# Patient Record
Sex: MALE | Employment: OTHER | URBAN - METROPOLITAN AREA
[De-identification: names, ages, dates, MRNs, and addresses within clinical notes are randomized per-mention and may not be internally consistent; named-entity substitution may affect disease eponyms.]

---

## 2021-11-11 ENCOUNTER — OFFICE VISIT (OUTPATIENT)
Dept: ENDOCRINOLOGY | Facility: HOSPITAL | Age: 61
End: 2021-11-11
Payer: COMMERCIAL

## 2021-11-11 VITALS
BODY MASS INDEX: 40.43 KG/M2 | HEART RATE: 80 BPM | HEIGHT: 74 IN | WEIGHT: 315 LBS | DIASTOLIC BLOOD PRESSURE: 82 MMHG | SYSTOLIC BLOOD PRESSURE: 148 MMHG

## 2021-11-11 DIAGNOSIS — Z79.4 TYPE 2 DIABETES MELLITUS WITH HYPERGLYCEMIA, WITH LONG-TERM CURRENT USE OF INSULIN (HCC): Primary | ICD-10-CM

## 2021-11-11 DIAGNOSIS — I10 PRIMARY HYPERTENSION: ICD-10-CM

## 2021-11-11 DIAGNOSIS — E11.42 DIABETIC POLYNEUROPATHY ASSOCIATED WITH TYPE 2 DIABETES MELLITUS (HCC): ICD-10-CM

## 2021-11-11 DIAGNOSIS — E11.65 TYPE 2 DIABETES MELLITUS WITH HYPERGLYCEMIA, WITH LONG-TERM CURRENT USE OF INSULIN (HCC): Primary | ICD-10-CM

## 2021-11-11 DIAGNOSIS — E11.21 DIABETIC NEPHROPATHY ASSOCIATED WITH TYPE 2 DIABETES MELLITUS (HCC): ICD-10-CM

## 2021-11-11 DIAGNOSIS — E78.2 MIXED HYPERLIPIDEMIA: ICD-10-CM

## 2021-11-11 PROBLEM — E11.319 DIABETIC RETINOPATHY ASSOCIATED WITH TYPE 2 DIABETES MELLITUS (HCC): Status: ACTIVE | Noted: 2021-11-11

## 2021-11-11 PROCEDURE — 99245 OFF/OP CONSLTJ NEW/EST HI 55: CPT | Performed by: INTERNAL MEDICINE

## 2021-11-11 RX ORDER — ASPIRIN 81 MG/1
81 TABLET ORAL DAILY
COMMUNITY

## 2021-11-11 RX ORDER — INSULIN ASPART 100 [IU]/ML
INJECTION, SOLUTION INTRAVENOUS; SUBCUTANEOUS
COMMUNITY
Start: 2021-10-24

## 2021-11-11 RX ORDER — INSULIN DEGLUDEC 200 U/ML
76 INJECTION, SOLUTION SUBCUTANEOUS
COMMUNITY
Start: 2021-11-10

## 2021-11-11 RX ORDER — AMLODIPINE BESYLATE 5 MG/1
5 TABLET ORAL DAILY
COMMUNITY
Start: 2021-10-14

## 2021-11-11 RX ORDER — LOSARTAN POTASSIUM AND HYDROCHLOROTHIAZIDE 25; 100 MG/1; MG/1
1 TABLET ORAL DAILY
COMMUNITY
Start: 2021-10-09

## 2021-11-11 RX ORDER — ATORVASTATIN CALCIUM 20 MG/1
20 TABLET, FILM COATED ORAL DAILY
COMMUNITY
Start: 2021-11-01

## 2023-04-22 LAB — HBA1C MFR BLD HPLC: 7.7 %

## 2023-05-04 ENCOUNTER — OFFICE VISIT (OUTPATIENT)
Dept: ENDOCRINOLOGY | Facility: HOSPITAL | Age: 63
End: 2023-05-04

## 2023-05-04 VITALS
OXYGEN SATURATION: 94 % | WEIGHT: 315 LBS | HEIGHT: 74 IN | DIASTOLIC BLOOD PRESSURE: 78 MMHG | BODY MASS INDEX: 40.43 KG/M2 | SYSTOLIC BLOOD PRESSURE: 164 MMHG | HEART RATE: 66 BPM

## 2023-05-04 DIAGNOSIS — E11.65 TYPE 2 DIABETES MELLITUS WITH HYPERGLYCEMIA, WITH LONG-TERM CURRENT USE OF INSULIN (HCC): Primary | ICD-10-CM

## 2023-05-04 DIAGNOSIS — Z79.4 TYPE 2 DIABETES MELLITUS WITH HYPERGLYCEMIA, WITH LONG-TERM CURRENT USE OF INSULIN (HCC): Primary | ICD-10-CM

## 2023-05-04 RX ORDER — FUROSEMIDE 20 MG/1
TABLET ORAL
COMMUNITY
Start: 2023-05-02

## 2023-05-04 RX ORDER — PEN NEEDLE, DIABETIC 31 GX5/16"
NEEDLE, DISPOSABLE MISCELLANEOUS 4 TIMES DAILY
COMMUNITY
Start: 2023-04-10

## 2023-05-04 RX ORDER — BLOOD SUGAR DIAGNOSTIC
STRIP MISCELLANEOUS 3 TIMES DAILY
COMMUNITY
Start: 2023-04-07

## 2023-05-04 NOTE — PATIENT INSTRUCTIONS
Monitor diet and maintain physical activity  Continue with insulin dosages  Send in blood sugar logs in 2 weeks  Contact the office with any concerns or questions  We will work on getting Dexcom approved  You can follow up with diabetes education to help with set up if needed  Follow-up in 3 months with lab work completed prior to visit

## 2023-05-04 NOTE — PROGRESS NOTES
Mari Edwards 59 y o  male MRN: [id number]    Encounter: [id number]      Assessment/Plan     Assessment: This is a 59y o -year-old male with 2 diabetes with neuropathy, hypertension and hyperlipidemia  Plan:  1  Type 2 diabetes: Recent hemoglobin A1c was 7 7  Unfortunately he did not present with blood sugar logs at today's office visit  Will not make any adjustments to his insulin  Did go into detail about different options in managing his glucose levels  He is interested in both a Dexcom and an insulin pump  Although he does not do carb counting, it does seem like he has an understanding of it  This time will not make any adjustments to his insulin and he will continue with Tresiba 70 units daily and sliding scale of NovoLog  Continue with lifestyle modifications to help improve glucose levels  Contact the office with any concerns or questions  Follow-up in 3 months with lab work completed prior to visit  Patient is a type II diabetic currently utilizing for more insulin injections a day  He is checking glucose levels 3 or more times a day and make adjustments to his insulin based on current glucose levels  Because of these factors, I think it be beneficial for him to utilize a Dexcom and minimize the risk of future complications  2   Diabetic neuropathy: Stable  Diabetic foot exam was completed today  Did encourage him following up with podiatry  3   Diabetic nephropathy: Both BUN and creatinine were elevated  Did discuss possible cause of this one of them could possibly be dehydration  Ultimately would like to get his glucose levels under control  Continue with losartan-HCTZ  Repeat microalbumin prior to next office visit  4   Hypertension: Pressure was elevated in office today  Currently asymptomatic  Has been working with his PCP to help reduce blood pressure  Continue with current medication    We will repeat CMP prior to next office visit and assess kidney function  5   Hyperlipidemia: Lipid panel is excellent with triglycerides being slightly high high  This could be due to hyperglycemia  We will continue to monitor  Continue with atorvastatin at this time  CC: Type 2 diabetes follow-up    History of Present Illness     HPI:  Kylie Aviles is a 62y o  year old male with type 2 diabetes, insulin requiring with retinopathy, neuropathy, nephropathy for 14 years , hypertension, hyperlipidemia for follow-up  Has been lost to follow-up and last seen November 2021  He was switched to insulin therapy in March 2019 when 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 with 1 and half months in the hospital   Prior to that, he was only on metformin and was poorly managing his diabetes  He is on insulin at home and takes [place] Insulin 70 units at bedtime and novolog insulin via sliding scale, 12 units for blood sugars , 14 units for blood sugars 101-150, 16 units for blood sugars 151-200, 18 units for blood sugars 201-250, and 20 units for blood sugars over 250  He admits to polyuria, polydipsia, and nocturia every 2 and half to 3 hours  He denies polyphagia and blurry vision  His feet do feel cold and he has some tingling at times along with numbness  He denies chest pain or shortness of breath  He denies heart attack, stroke and claudication but does admit to neuropathy, nephropathy and retinopathy          While in hospital when diagnosed with Padmini gangrene and switched insulin therapy, he was instructed to eat 5 carbohydrate servings per meals which he tries to do an does understand that 1 of those is equal to 15 g of carbohydrate  Unfortunately, he is self-employed and works 10 hour days  This causes him to eat supper very late and he has a variable eating schedule  This has unchanged since his last office visit  He is interested in utilizing a CGM and insulin pump to help better control his gross levels  Does have a basic understanding of making adjustments to insulin based on carbohydrates  Is following up with a dietitian and has made dietary changes      Hypoglycemic episodes:  None recently  H/o of hypoglycemia causing hospitalization or intervention such as glucagon injection  or ambulance call  No   Hypoglycemia symptoms: dizziness, sweating and feels lightheaded and off now     Treatment of hypoglycemia: will eat a piece of candy or drink a juice  Glucagon:No   Medic alert tag: recommended,Yes       The patient's last eye exam was in November 2021 at [facility] 85 eye every 6 weks for retinopathy, gets injections as needed, post lasar surgery twice in the past   The patient's last foot exam was not recently  Does not see podiatry  Last A1C was 6 7%  In October 2021      Blood Sugar/Glucometer/Pump/CGM review:   He checks his blood sugars at least 3 or more times a day  Unfortunately he did not bring in blood sugar logs in today  States that fasting blood sugars are typically between 100-150      He has hyperlipidemia and takes atorvastatin 20 mg daily  He denies chest pain or shortness of breath      He has hypertension and takes losartan/ HCTZ 100 mg/25 mg daily and amlodipine 5 mg daily  He denies headache or stroke-like symptoms  Review of Systems   Constitutional: Negative for activity change, appetite change, fatigue and unexpected weight change  HENT: Negative for trouble swallowing  Eyes: Negative for visual disturbance  Respiratory: Negative for chest tightness and shortness of breath  Cardiovascular: Positive for leg swelling  Negative for chest pain and palpitations  Gastrointestinal: Negative for abdominal pain, diarrhea, nausea and vomiting  Endocrine: Negative for cold intolerance, heat intolerance, polydipsia, polyphagia and polyuria  Genitourinary: Negative for frequency  Nocturia   Skin: Negative for rash and wound  Neurological: Positive for numbness   Negative for dizziness, weakness, light-headedness and headaches  Psychiatric/Behavioral: Negative for dysphoric mood and sleep disturbance  The patient is not nervous/anxious          Historical Information   Past Medical History:   Diagnosis Date    Padmini's gangrene of scrotum     necrotizing fasciitis    Necrotizing fasciitis ([address] )     scrotum     Past Surgical History:   Procedure Laterality Date    APPENDECTOMY      KNEE ARTHROSCOPY      LAPAROSCOPIC CHOLECYSTECTOMY      SCROTAL SURGERY      multiple surgeries and debribement for scrotal absess and infection     Social History   Social History     Substance and Sexual Activity   Alcohol Use Never     Social History     Substance and Sexual Activity   Drug Use Never     Social History     Tobacco Use   Smoking Status Former    Types: Cigarettes    Quit date: 200    Years since quittin 3   Smokeless Tobacco Never     Family History:   Family History   Adopted: Yes   Problem Relation Age of Onset    Obesity Mother     Obesity Sister     Obesity Brother     No Known Problems Son     Obesity Sister        Meds/Allergies   Current Outpatient Medications   Medication Sig Dispense Refill    amLODIPine (NORVASC) 5 mg tablet Take 5 mg by mouth daily        aspirin (ECOTRIN LOW STRENGTH) 81 mg EC tablet Take 81 mg by mouth daily      atorvastatin (LIPITOR) 20 mg tablet Take 20 mg by mouth daily        B-D ULTRAFINE III SHORT PEN 31G X 8 MM MISC 4 (four) times a day      furosemide (LASIX) 20 mg tablet       losartan-hydrochlorothiazide (HYZAAR) 100-25 MG per tablet Take 1 tablet by mouth daily        Multiple Vitamins-Minerals (MENS 50+ MULTI VITAMIN/MIN PO) Take by mouth      Multiple Vitamins-Minerals (OCUVITE ADULT 50+ PO) Take by mouth Take 2 daily      NovoLOG FlexPen 100 units/mL injection pen 12-22 units per sliding scale at meals 3 times daily      OneTouch Ultra test strip 3 (three) times a day Test     Katarzyna Perches Tresiba FlexTouch 200 units/mL "CONCENTRATED U-200 injection pen Inject 70 Units under the skin daily at bedtime       No current facility-administered medications for this visit  Allergies   Allergen Reactions    Penicillins Other (See Comments)     unknown       Objective   Vitals: Blood pressure 164/78, pulse 66, height 6' 2\" (1 88 m), weight (!) 147 kg (324 lb), SpO2 94 %  Physical Exam  Vitals and nursing note reviewed  Constitutional:       General: He is not in acute distress  Appearance: Normal appearance  He is not diaphoretic  HENT:      Head: Normocephalic and atraumatic  Eyes:      General: No scleral icterus  Extraocular Movements: Extraocular movements intact  Conjunctiva/sclera: Conjunctivae normal       Pupils: Pupils are equal, round, and reactive to light  Cardiovascular:      Rate and Rhythm: Normal rate and regular rhythm  Pulses: no weak pulses          Dorsalis pedis pulses are 2+ on the right side and 2+ on the left side  Posterior tibial pulses are 2+ on the right side and 2+ on the left side  Heart sounds: No murmur heard  Pulmonary:      Effort: Pulmonary effort is normal  No respiratory distress  Breath sounds: Normal breath sounds  No wheezing  Musculoskeletal:      Cervical back: Normal range of motion  Right lower leg: Edema present  Left lower leg: Edema present  Feet:      Right foot:      Skin integrity: Callus and dry skin present  No ulcer, skin breakdown, erythema or warmth  Left foot:      Skin integrity: Callus and dry skin present  No ulcer, skin breakdown, erythema or warmth  Lymphadenopathy:      Cervical: No cervical adenopathy  Skin:     General: Skin is warm and dry  Neurological:      Mental Status: He is alert and oriented to person, place, and time  Mental status is at baseline  Sensory: No sensory deficit        Gait: Gait normal    Psychiatric:         Mood and Affect: Mood normal          Behavior: Behavior normal     " "     Thought Content: Thought content normal      Patient's shoes and socks removed  Right Foot/Ankle   Right Foot Inspection  Skin Exam: skin normal, skin intact, dry skin, callus and callus  No warmth, no erythema, no maceration, no abnormal color, no pre-ulcer and no ulcer  Toe Exam: ROM and strength within normal limits and swelling  No tenderness, erythema and  no right toe deformity    Sensory   Vibration: diminished  Proprioception: intact  Monofilament testing: diminished    Vascular  Capillary refills: < 3 seconds  The right DP pulse is 2+  The right PT pulse is 2+  Left Foot/Ankle  Left Foot Inspection  Skin Exam: skin normal, skin intact, dry skin and callus  No warmth, no erythema, no maceration, normal color, no pre-ulcer and no ulcer  Toe Exam: ROM and strength within normal limits and swelling  No tenderness, no erythema and no left toe deformity  Sensory   Vibration: diminished  Proprioception: intact  Monofilament testing: intact    Vascular  Capillary refills: < 3 seconds  The left DP pulse is 2+  The left PT pulse is 2+  Assign Risk Category  No deformity present  No loss of protective sensation  No weak pulses  Risk: 0        The history was obtained from the review of the chart, patient  Portions of the record may have been created with voice recognition software  Occasional wrong word or \"sound a like\" substitutions may have occurred due to the inherent limitations of voice recognition software  Read the chart carefully and recognize, using context, where substitutions have occurred    "

## 2023-05-05 ENCOUNTER — TELEPHONE (OUTPATIENT)
Dept: ADMINISTRATIVE | Facility: OTHER | Age: 63
End: 2023-05-05

## 2023-05-05 NOTE — LETTER
Diabetic Eye Exam Form    Date Requested: 23  Patient: Vasile Nunez  Patient : 1960   Referring Provider: JANINE Santiago      DIABETIC Eye Exam Date _______________________________      Type of Exam MUST be documented for Diabetic Eye Exams  Please CHECK ONE  Retinal Exam       Dilated Retinal Exam       OCT       Optomap-Iris Exam      Fundus Photography       Left Eye - Please check Retinopathy or No Retinopathy        Exam did show retinopathy    Exam did not show retinopathy       Right Eye - Please check Retinopathy or No Retinopathy       Exam did show retinopathy    Exam did not show retinopathy       Comments __________________________________________________________    Practice Providing Exam ______________________________________________    Exam Performed By (print name) _______________________________________      Provider Signature ___________________________________________________      These reports are needed for  compliance  Please fax this completed form and a copy of the Diabetic Eye Exam report to our office located at [place] [address] as soon as possible via Fax [phone] kelsy Hugo: Phone [phone]  We thank you for your assistance in treating our mutual patient

## 2023-05-05 NOTE — LETTER
Diabetic Eye Exam Form    Date Requested: 05/10/23  Patient: Tre Lockhart  Patient : 1960   Referring Provider: JANINE Bhandari      DIABETIC Eye Exam Date _______________________________      Type of Exam MUST be documented for Diabetic Eye Exams  Please CHECK ONE  Retinal Exam       Dilated Retinal Exam       OCT       Optomap-Iris Exam      Fundus Photography       Left Eye - Please check Retinopathy or No Retinopathy        Exam did show retinopathy    Exam did not show retinopathy       Right Eye - Please check Retinopathy or No Retinopathy       Exam did show retinopathy    Exam did not show retinopathy       Comments __________________________________________________________    Practice Providing Exam ______________________________________________    Exam Performed By (print name) _______________________________________      Provider Signature ___________________________________________________      These reports are needed for  compliance  Please fax this completed form and a copy of the Diabetic Eye Exam report to our office located at [place] [address] as soon as possible via Fax [phone] attention Anthony Calderón: Phone [phone]  We thank you for your assistance in treating our mutual patient

## 2023-05-05 NOTE — TELEPHONE ENCOUNTER
----- Message from Agata Cortez MA sent at 5/4/2023  1:52 PM EDT -----  Regarding: diabetic eye exam  05/04/23 1:57 PM    Hello, our patient Hussein Paredes has had diabetic eye exam completed/performed  Please assist in updating the patient chart by HILARY Dennison  The date of service is 2023      Thank you,  Agata Cortez MA  [facility]

## 2023-05-05 NOTE — TELEPHONE ENCOUNTER
Upon review of the In Basket request and the patient's chart, initial outreach has been made via fax to facility  Please see Contacts section for details       Thank you  Bairon Santoro MA

## 2023-05-10 NOTE — TELEPHONE ENCOUNTER
Upon review of the In Basket request and the patient's chart, initial outreach has been made via fax to facility  Please see Contacts section for details       Thank you  Jocelyn Meek MA

## 2023-05-17 NOTE — TELEPHONE ENCOUNTER
As a final attempt, a third outreach has been made via fax to facility  Please see Contacts section for details  This encounter will be closed and completed by end of day  Should we receive the requested information because of previous outreach attempts, the requested patient's chart will be updated appropriately       Thank you  Dominick Knox MA

## 2023-08-17 LAB
LEFT EYE DIABETIC RETINOPATHY: POSITIVE
RIGHT EYE DIABETIC RETINOPATHY: POSITIVE
SEVERITY (EYE EXAM): NORMAL

## 2023-09-06 ENCOUNTER — OFFICE VISIT (OUTPATIENT)
Dept: ENDOCRINOLOGY | Facility: HOSPITAL | Age: 63
End: 2023-09-06
Payer: COMMERCIAL

## 2023-09-06 VITALS
SYSTOLIC BLOOD PRESSURE: 142 MMHG | OXYGEN SATURATION: 96 % | DIASTOLIC BLOOD PRESSURE: 78 MMHG | HEART RATE: 66 BPM | WEIGHT: 315 LBS | BODY MASS INDEX: 42.24 KG/M2

## 2023-09-06 DIAGNOSIS — Z79.4 TYPE 2 DIABETES MELLITUS WITH HYPERGLYCEMIA, WITH LONG-TERM CURRENT USE OF INSULIN (HCC): Primary | ICD-10-CM

## 2023-09-06 DIAGNOSIS — E11.65 TYPE 2 DIABETES MELLITUS WITH HYPERGLYCEMIA, WITH LONG-TERM CURRENT USE OF INSULIN (HCC): Primary | ICD-10-CM

## 2023-09-06 DIAGNOSIS — E78.2 MIXED HYPERLIPIDEMIA: ICD-10-CM

## 2023-09-06 DIAGNOSIS — I10 PRIMARY HYPERTENSION: ICD-10-CM

## 2023-09-06 DIAGNOSIS — E66.9 OBESITY WITH SERIOUS COMORBIDITY, UNSPECIFIED CLASSIFICATION, UNSPECIFIED OBESITY TYPE: ICD-10-CM

## 2023-09-06 PROCEDURE — 99214 OFFICE O/P EST MOD 30 MIN: CPT | Performed by: PHYSICIAN ASSISTANT

## 2023-09-06 RX ORDER — AMLODIPINE BESYLATE 10 MG/1
10 TABLET ORAL DAILY
COMMUNITY
Start: 2023-07-06

## 2023-09-06 RX ORDER — ATENOLOL 25 MG/1
25 TABLET ORAL DAILY
COMMUNITY
Start: 2023-08-04

## 2023-09-06 NOTE — PROGRESS NOTES
Michael Kramer 59 y.o. male MRN: [id number]    Encounter: [id number]      Assessment/Plan     Assessment: This is a 59y.o.-year-old male with type 2 diabetes with neuropathy, hypertension and hyperlipidemia. Plan:  1. Type 2 diabetes: Recent hemoglobin A1c was 7.7. However no lab work was completed prior to today's office visit. States that he will get it done tomorrow. Glucose logs show that fasting glucose levels are running high, but is doing better throughout the day. At this time I would like him to continue with Tresiba 70 units daily and NovoLog 12 units with breakfast and lunch plus the addition of a sliding scale. As for dinner I like to increase his NovoLog to 16 units plus a sliding scale. Continue with lifestyle modifications to help improve glucose levels. Referred him to our diabetes educator to help with diabetic diet, and also carb counting. Contact the office with any concerns or questions. Follow-up in 3 months with lab work completed prior to visit.     Patient is a type II diabetic currently utilizing for more insulin injections a day. He is checking glucose levels 3 or more times a day and make adjustments to his insulin based on current glucose levels. Because of these factors, I think it be beneficial for him to utilize a Dexcom and minimize the risk of future complications.     2.  Diabetic neuropathy: Stable. Diabetic foot exam is up-to-date.     3. Diabetic nephropathy: Both BUN and creatinine were elevated. No lab work completed prior to today's office visit, but once results are in we will make further recommendations. Ultimately would like to get his glucose levels under control. Continue with losartan-HCTZ. Repeat microalbumin prior to next office visit.     4. Hypertension: Normotensive in the office today. Has been working with his PCP to help reduce blood pressure. Continue with current medication.   We will repeat CMP prior to next office visit and assess kidney function.     5. Hyperlipidemia: Lipid panel is excellent with triglycerides being slightly high high. This could be due to hyperglycemia. We will continue to monitor. Continue with atorvastatin at this time. CC: Type 2 diabetes follow-up    History of Present Illness     HPI:  Lizandro Bump old male with type 2 diabetes, insulin requiring with retinopathy, neuropathy, nephropathy for 14 years , hypertension, hyperlipidemia for follow-up.  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 Jamie Nidia to that, he was only on metformin and was poorly managing his diabetes. He is on insulin at home and takes [place] Insulin 70 units at bedtime and novolog insulin via sliding scale, 12 units for blood sugars , 14 units for blood sugars 101-150, 16 units for blood sugars 151-200, 18 units for blood sugars 201-250, and 20 units for blood sugars over 250. He admits to polyuria, polydipsia, and nocturia every 2 and half to 3 hours.  He denies polyphagia and blurry vision.  States recently neuropathy has improved.  He denies chest pain or shortness of breath. He denies heart attack, stroke and claudication but does admit to neuropathy, nephropathy and retinopathy.  States that he has interest in bariatric surgery. Also thinks that he needs to have adjustments to his insulin as he is having a difficult time getting glucose levels under control.       While in hospital when diagnosed with Padmini gangrene and switched insulin therapy, he was instructed to eat 5 carbohydrate servings per meals which he tries to do an does understand that 1 of those is equal to 15 g of carbohydrate.  Unfortunately, he is self-employed and works 10 hour days.  This causes him to eat supper very late and he has a variable eating schedule. This has unchanged since his last office visit. He is interested in utilizing a CGM and insulin pump to help better control his gross levels. Does have a basic understanding of making adjustments to insulin based on carbohydrates. Is interested in following up with our diabetes educator.     Hypoglycemic episodes: Last night had symptoms of hypoglycemia. Otherwise rare episodes. H/o of hypoglycemia causing hospitalization or intervention such as glucagon injection  or ambulance call  No.  Hypoglycemia symptoms: dizziness, sweating and feels lightheaded and off now. .  Treatment of hypoglycemia: will eat a piece of candy or drink a juice. Glucagon:No.  Medic alert tag: recommended,Yes.      The patient's last eye exam was in November 2021 at [facility] eye every 6 weks for retinopathy, gets injections as needed, post lasar surgery twice in the past. Tylor Nelson patient's last foot exam was not recently. Does not see podiatry.  Most recent hemoglobin A1c was completed April 22, 2023 and was 7.7.     Blood Sugar/Glucometer/Pump/CGM review: Currently checking glucose levels 3 times a day. Fasting glucose levels are ranging between 140 and 180 with a few outliers. Prior to lunch is typically in the low 100s. Prior to dinner he is in the low 100s to mid 100s.    He has hyperlipidemia and takes atorvastatin 20 mg daily. He denies chest pain or shortness of breath.     He has hypertension and takes losartan/ HCTZ 100 mg/25 mg daily and amlodipine 5 mg daily.  He denies headache or stroke-like symptoms. Review of Systems   Constitutional: Negative for activity change, appetite change, fatigue and unexpected weight change. HENT: Negative for trouble swallowing. Eyes: Negative for visual disturbance. Respiratory: Negative for chest tightness and shortness of breath. Cardiovascular: Positive for leg swelling. Negative for chest pain and palpitations. Gastrointestinal: Negative for abdominal pain, diarrhea, nausea and vomiting.    Endocrine: Negative for cold intolerance, heat intolerance, polydipsia, polyphagia and polyuria. Genitourinary: Negative for frequency. Nocturia   Skin: Negative for rash and wound. Neurological: Negative for dizziness, weakness, light-headedness, numbness and headaches. Psychiatric/Behavioral: Negative for dysphoric mood and sleep disturbance. The patient is not nervous/anxious.         Historical Information   Past Medical History:   Diagnosis Date   • Padmini's gangrene of scrotum     necrotizing fasciitis   • Necrotizing fasciitis ([address])     scrotum     Past Surgical History:   Procedure Laterality Date   • APPENDECTOMY     • KNEE ARTHROSCOPY     • LAPAROSCOPIC CHOLECYSTECTOMY     • SCROTAL SURGERY      multiple surgeries and debribement for scrotal absess and infection     Social History   Social History     Substance and Sexual Activity   Alcohol Use Never     Social History     Substance and Sexual Activity   Drug Use Never     Social History     Tobacco Use   Smoking Status Former   • Types: Cigarettes   • Quit date:    • Years since quittin.7   Smokeless Tobacco Never     Family History:   Family History   Adopted: Yes   Problem Relation Age of Onset   • Obesity Mother    • Obesity Sister    • Obesity Brother    • No Known Problems Son    • Obesity Sister        Meds/Allergies   Current Outpatient Medications   Medication Sig Dispense Refill   • amLODIPine (NORVASC) 10 mg tablet Take 10 mg by mouth daily     • aspirin (ECOTRIN LOW STRENGTH) 81 mg EC tablet Take 81 mg by mouth daily     • atenolol (TENORMIN) 25 mg tablet Take 25 mg by mouth daily     • atorvastatin (LIPITOR) 20 mg tablet Take 20 mg by mouth daily       • B-D ULTRAFINE III SHORT PEN 31G X 8 MM MISC 4 (four) times a day     • furosemide (LASIX) 20 mg tablet      • losartan-hydrochlorothiazide (HYZAAR) 100-25 MG per tablet Take 1 tablet by mouth daily       • Multiple Vitamins-Minerals (MENS 50+ MULTI VITAMIN/MIN PO) Take by mouth     • Multiple Vitamins-Minerals (OCUVITE ADULT 50+ PO) Take by mouth Take 2 daily     • NovoLOG FlexPen 100 units/mL injection pen 12-22 units per sliding scale at meals 3 times daily     • OneTouch Ultra test strip 3 (three) times a day Test     • Tresiba FlexTouch 200 units/mL CONCENTRATED U-200 injection pen Inject 70 Units under the skin daily at bedtime       No current facility-administered medications for this visit. Allergies   Allergen Reactions   • Penicillins Other (See Comments)     unknown       Objective   Vitals: Blood pressure 142/78, pulse 66, weight (!) 149 kg (329 lb), SpO2 96 %. Physical Exam  Vitals and nursing note reviewed. Constitutional:       General: He is not in acute distress. Appearance: Normal appearance. He is not diaphoretic. HENT:      Head: Normocephalic and atraumatic. Eyes:      General: No scleral icterus. Extraocular Movements: Extraocular movements intact. Conjunctiva/sclera: Conjunctivae normal.      Pupils: Pupils are equal, round, and reactive to light. Cardiovascular:      Rate and Rhythm: Normal rate and regular rhythm. Heart sounds: No murmur heard. Pulmonary:      Effort: Pulmonary effort is normal. No respiratory distress. Breath sounds: Normal breath sounds. No wheezing. Musculoskeletal:      Cervical back: Normal range of motion. Right lower leg: Edema present. Left lower leg: Edema present. Lymphadenopathy:      Cervical: No cervical adenopathy. Skin:     General: Skin is warm and dry. Neurological:      Mental Status: He is alert and oriented to person, place, and time. Mental status is at baseline. Sensory: No sensory deficit. Gait: Gait normal.   Psychiatric:         Mood and Affect: Mood normal.         Behavior: Behavior normal.         Thought Content: Thought content normal.         The history was obtained from the review of the chart, patient.     Lab Results:   Lab Results   Component Value Date/Time Hemoglobin A1C 7.7 04/22/2023 12:00 AM         Portions of the record may have been created with voice recognition software. Occasional wrong word or "sound a like" substitutions may have occurred due to the inherent limitations of voice recognition software. Read the chart carefully and recognize, using context, where substitutions have occurred.

## 2023-09-06 NOTE — PATIENT INSTRUCTIONS
Monitor diet and maintain physical activity. Continue Tresiba 70 units daily. Continue Novolog 12 units with breakfast and lunch with sliding scale. Increase to 16 units with dinner plus sliding scale. Send in blood sugar logs in 2 weeks. Contact the office with any concerns or questions. We will work on getting Dexcom approved. You can follow up with diabetes education to help with set up if needed. Follow-up in 3 months with lab work completed prior to visit.

## 2023-09-07 DIAGNOSIS — Z79.4 TYPE 2 DIABETES MELLITUS WITH HYPERGLYCEMIA, WITH LONG-TERM CURRENT USE OF INSULIN (HCC): Primary | ICD-10-CM

## 2023-09-07 DIAGNOSIS — E11.65 TYPE 2 DIABETES MELLITUS WITH HYPERGLYCEMIA, WITH LONG-TERM CURRENT USE OF INSULIN (HCC): Primary | ICD-10-CM

## 2023-09-07 DIAGNOSIS — E11.42 DIABETIC POLYNEUROPATHY ASSOCIATED WITH TYPE 2 DIABETES MELLITUS (HCC): ICD-10-CM

## 2023-09-07 DIAGNOSIS — E11.21 DIABETIC NEPHROPATHY ASSOCIATED WITH TYPE 2 DIABETES MELLITUS (HCC): ICD-10-CM

## 2023-09-07 RX ORDER — ACYCLOVIR 400 MG/1
1 TABLET ORAL CONTINUOUS
Qty: 1 EACH | Refills: 0 | Status: SHIPPED | OUTPATIENT
Start: 2023-09-07

## 2023-09-07 RX ORDER — ACYCLOVIR 400 MG/1
1 TABLET ORAL
Qty: 9 EACH | Refills: 3 | Status: SHIPPED | OUTPATIENT
Start: 2023-09-07

## 2023-09-12 LAB
ALBUMIN SERPL-MCNC: 4 G/DL (ref 3.9–4.9)
ALBUMIN/GLOB SERPL: 1.9 {RATIO} (ref 1.2–2.2)
ALP SERPL-CCNC: 65 IU/L (ref 44–121)
ALT SERPL-CCNC: 23 IU/L (ref 0–44)
AST SERPL-CCNC: 18 IU/L (ref 0–40)
BILIRUB SERPL-MCNC: 0.3 MG/DL (ref 0–1.2)
BUN SERPL-MCNC: 29 MG/DL (ref 8–27)
BUN/CREAT SERPL: 24 (ref 10–24)
CALCIUM SERPL-MCNC: 9.1 MG/DL (ref 8.6–10.2)
CHLORIDE SERPL-SCNC: 108 MMOL/L (ref 96–106)
CO2 SERPL-SCNC: 20 MMOL/L (ref 20–29)
CREAT SERPL-MCNC: 1.21 MG/DL (ref 0.76–1.27)
EGFR: 68 ML/MIN/1.73
GLOBULIN SER-MCNC: 2.1 G/DL (ref 1.5–4.5)
GLUCOSE SERPL-MCNC: 159 MG/DL (ref 70–99)
HBA1C MFR BLD: 7.2 % (ref 4.8–5.6)
POTASSIUM SERPL-SCNC: 4.6 MMOL/L (ref 3.5–5.2)
PROT SERPL-MCNC: 6.1 G/DL (ref 6–8.5)
SODIUM SERPL-SCNC: 141 MMOL/L (ref 134–144)

## 2023-09-18 ENCOUNTER — TELEPHONE (OUTPATIENT)
Dept: DIABETES SERVICES | Facility: CLINIC | Age: 63
End: 2023-09-18

## 2023-09-18 DIAGNOSIS — E11.65 TYPE 2 DIABETES MELLITUS WITH HYPERGLYCEMIA, WITH LONG-TERM CURRENT USE OF INSULIN (HCC): Primary | ICD-10-CM

## 2023-09-18 DIAGNOSIS — Z79.4 TYPE 2 DIABETES MELLITUS WITH HYPERGLYCEMIA, WITH LONG-TERM CURRENT USE OF INSULIN (HCC): Primary | ICD-10-CM

## 2023-09-18 NOTE — TELEPHONE ENCOUNTER
Pt has 9/26 appt for Dexcom training. Current referral does not state Dexcom. Please issue a new one.  Thanks

## 2023-09-26 ENCOUNTER — OFFICE VISIT (OUTPATIENT)
Dept: DIABETES SERVICES | Facility: HOSPITAL | Age: 63
End: 2023-09-26
Payer: COMMERCIAL

## 2023-09-26 DIAGNOSIS — E11.65 TYPE 2 DIABETES MELLITUS WITH HYPERGLYCEMIA, WITH LONG-TERM CURRENT USE OF INSULIN (HCC): Primary | ICD-10-CM

## 2023-09-26 DIAGNOSIS — Z79.4 TYPE 2 DIABETES MELLITUS WITH HYPERGLYCEMIA, WITH LONG-TERM CURRENT USE OF INSULIN (HCC): Primary | ICD-10-CM

## 2023-09-26 PROCEDURE — 95249 CONT GLUC MNTR PT PROV EQP: CPT | Performed by: DIETITIAN, REGISTERED

## 2023-09-26 NOTE — PROGRESS NOTES
Dexcom G7 Personal Training    Met with Alexandr Enamorado for [employer] personal training. Patient comes in today with there own unit to be trained on. Completed all aspects of training, including site selection on rotation, not infusing insulin near the sensor site, proper insertion technique, inserting codes into the , charging, waterproof sensor, range of 20ft, setting high low alarms that can be adjusted based on their preferences. They put on their first sensor by themselves with no issue. Left my office today with sensor on and in 30 min warm up mode. Alexandr Enamorado will be running the dexcom through their . He would also like MNT, he will verify his insurance coverage first and then give us a call to schedule. Education must be billed under Dr. Lars Vail, per his insurance. Discussed creating a Clarity account to be able to link and upload from home or auto upload from their phone. Patient was added to our clarity account today while in office and invited to link to us if using their phone. Patient understands that Cassy Kendall will be downloaded while in office at time of visit and/or cell phone will automatically upload to our clarity for them. They understand that their blood sugars are not monitored by us on a regular basis, but that we can access them as needed or desired by the patient and provider. Dexcom's phone number is in their paperwork, encouraged Edson Alcazar to reach out to [facility] if they have any issues after hours, 24/7. Training completed, will call with questions.      Lab Results   Component Value Date    HGBA1C 7.2 (H) 09/11/2023       Lab Results   Component Value Date    SODIUM 141 09/11/2023    K 4.6 09/11/2023     (H) 09/11/2023    CO2 20 09/11/2023    BUN 29 (H) 09/11/2023    CREATININE 1.21 09/11/2023    GLUC 159 (H) 09/11/2023    AST 18 09/11/2023    ALT 23 09/11/2023    TP 6.1 09/11/2023    TBILI 0.3 09/11/2023    EGFR 68 09/11/2023           Patient response to instruction    Comprehension: good  Motivation: good  Expected Compliance: good  Response to Teachback: 100%, demonstrated understanding    Thank you for referring your patient to [facility], it was a pleasure working with them today. Please feel free to call with any questions or concerns.

## 2023-09-26 NOTE — Clinical Note
[facility]- for your records, no action needed.  Thanks! -Tray Gomez

## 2023-10-24 ENCOUNTER — OFFICE VISIT (OUTPATIENT)
Dept: DIABETES SERVICES | Facility: HOSPITAL | Age: 63
End: 2023-10-24
Payer: COMMERCIAL

## 2023-10-24 DIAGNOSIS — E11.65 TYPE 2 DIABETES MELLITUS WITH HYPERGLYCEMIA, WITH LONG-TERM CURRENT USE OF INSULIN (HCC): Primary | ICD-10-CM

## 2023-10-24 DIAGNOSIS — Z79.4 TYPE 2 DIABETES MELLITUS WITH HYPERGLYCEMIA, WITH LONG-TERM CURRENT USE OF INSULIN (HCC): Primary | ICD-10-CM

## 2023-10-24 PROCEDURE — 98960 EDU&TRN PT SELF-MGMT NQHP 1: CPT | Performed by: DIETITIAN, REGISTERED

## 2023-12-11 ENCOUNTER — OFFICE VISIT (OUTPATIENT)
Dept: ENDOCRINOLOGY | Facility: HOSPITAL | Age: 63
End: 2023-12-11
Payer: COMMERCIAL

## 2023-12-11 VITALS
WEIGHT: 315 LBS | BODY MASS INDEX: 40.43 KG/M2 | DIASTOLIC BLOOD PRESSURE: 78 MMHG | HEART RATE: 61 BPM | HEIGHT: 74 IN | SYSTOLIC BLOOD PRESSURE: 146 MMHG | OXYGEN SATURATION: 96 %

## 2023-12-11 DIAGNOSIS — R60.9 EDEMA, UNSPECIFIED TYPE: ICD-10-CM

## 2023-12-11 DIAGNOSIS — E11.65 TYPE 2 DIABETES MELLITUS WITH HYPERGLYCEMIA, WITH LONG-TERM CURRENT USE OF INSULIN (HCC): Primary | ICD-10-CM

## 2023-12-11 DIAGNOSIS — Z79.4 TYPE 2 DIABETES MELLITUS WITH HYPERGLYCEMIA, WITH LONG-TERM CURRENT USE OF INSULIN (HCC): Primary | ICD-10-CM

## 2023-12-11 PROCEDURE — 99214 OFFICE O/P EST MOD 30 MIN: CPT | Performed by: PHYSICIAN ASSISTANT

## 2023-12-11 RX ORDER — TORSEMIDE 20 MG/1
40 TABLET ORAL DAILY
Qty: 180 TABLET | Refills: 1 | Status: SHIPPED | OUTPATIENT
Start: 2023-12-11

## 2023-12-11 NOTE — PATIENT INSTRUCTIONS
Monitor diet and maintain physical activity. Continue Tresiba 70 units daily. Continue Novolog 12 units with breakfast and lunch with sliding scale. Increase to 16 units with dinner plus sliding scale. Send in blood sugar logs in 2 weeks. Contact the office with any concerns or questions. Use Dexcom to monitor blood sugars. Follow-up in 3 months with lab work completed prior to visit.

## 2023-12-11 NOTE — PROGRESS NOTES
Royer Gastelum 59 y.o. male MRN: [id number]    Encounter: [id number]      Assessment/Plan     Assessment: This is a 59y.o.-year-old male with type 2 diabetes with neuropathy, hypertension and hyperlipidemia. Plan:  1. Type 2 diabetes: Recent hemoglobin A1c was 7.2. However no lab work was completed prior to today's office visit. Lab work completed later this week. I did discuss with him the importance of getting lab work completed prior to visit as it makes it easier to discuss results and possible changes if needed. At this time I would like him to continue with Tresiba 70 units daily and NovoLog 12 units with breakfast and lunch, and 16 units with dinner plus the addition of a sliding scale. Continue with lifestyle modifications to help improve glucose levels. Urged him to restart utilizing the Dexcom to help control glucose levels. Stop by the office if there is any concerns with elevated levels. Contact the office with any concerns or questions. Follow-up in 3 months with lab work completed prior to visit. Patient is a type II diabetic currently utilizing for more insulin injections a day. He is checking glucose levels 3 or more times a day and make adjustments to his insulin based on current glucose levels. Because of these factors, I think it be beneficial for him to utilize a Dexcom and minimize the risk of future complications. 2.  Diabetic neuropathy: Stable. Diabetic foot exam is up-to-date. 3.  Diabetic nephropathy: Showed slightly elevated BUN with normal creatinine. GFR remained stable. Asked him to get urine sample completed prior to next office visit. At this time kidney function is stable. 4.  Hypertension: Slightly elevated in office today. Did switch his furosemide to torsemide and increase the dose to see if this would help with leg swelling, and this may also help with his blood pressure. Continue other medications at this time.   We will repeat CMP prior to next office visit and assess kidney function. 5.  Hyperlipidemia: Lipid panel is excellent with triglycerides being slightly high high. This could be due to hyperglycemia. We will continue to monitor. Continue with atorvastatin at this time. CC: Type 2 diabetes follow-up    History of Present Illness     HPI:  Jessica Steele is a 59year old male with type 2 diabetes, insulin requiring with retinopathy, neuropathy, nephropathy for 14 years , hypertension, hyperlipidemia for follow-up. 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  Prior to that, he was only on metformin and was poorly managing his diabetes. He is on insulin at home and takes [place] Insulin 70 units at bedtime and novolog insulin 12 units with breakfast and lunch and 16 units with dinner via sliding scale, 2 units for every 50 points above 150. He admits to polyuria, polydipsia, and nocturia every 2 and half to 3 hours. He denies polyphagia and blurry vision. States recently neuropathy has improved. He denies chest pain or shortness of breath. He denies heart attack, stroke and claudication but does admit to neuropathy, nephropathy and retinopathy. Was prescribed a Dexcom G7 after last office visit. Found the device was excellent in helping him control his glucose levels, but is having difficulty getting the device to stick to his arm. Also does have concerns with swelling in bilateral legs. Hypoglycemic episodes: Last night had symptoms of hypoglycemia. Otherwise rare episodes. H/o of hypoglycemia causing hospitalization or intervention such as glucagon injection  or ambulance call  No.  Hypoglycemia symptoms: dizziness, sweating and feels lightheaded and off now. .  Treatment of hypoglycemia: will eat a piece of candy or drink a juice. Glucagon:No.  Medic alert tag: recommended,Yes.       The patient's last eye exam was in November 2021 at [address] eye every 6 weks for retinopathy, gets injections as needed, post lasar surgery twice in the past.  The patient's last foot exam was not recently. Does not see podiatry. Most recent hemoglobin A1c was completed September 11, 2023 was 7.2     Blood Sugar/Glucometer/Pump/CGM review: Currently checking glucose levels 2-3 times a day. Fasting blood sugars are between 110 and 140. Later in the day there is a bit more variability and can be between 97 and 231. In the evening which she is checking less frequently is in the high 100s. He has hyperlipidemia and takes atorvastatin 20 mg daily. He denies chest pain or shortness of breath. He has hypertension and takes losartan/ HCTZ 100 mg/25 mg daily, Lasix 20 mg daily, and amlodipine 5 mg daily. He denies headache or stroke-like symptoms. Review of Systems   Constitutional:  Negative for activity change, appetite change, fatigue and unexpected weight change. HENT:  Negative for trouble swallowing. Eyes:  Negative for visual disturbance. Respiratory:  Negative for chest tightness and shortness of breath. Cardiovascular:  Positive for leg swelling. Negative for chest pain and palpitations. Gastrointestinal:  Negative for abdominal pain, diarrhea, nausea and vomiting. Endocrine: Negative for cold intolerance, heat intolerance, polydipsia, polyphagia and polyuria. Genitourinary:  Negative for frequency. Nocturia   Skin:  Negative for rash and wound. Neurological:  Negative for dizziness, weakness, light-headedness, numbness and headaches. Psychiatric/Behavioral:  Negative for dysphoric mood and sleep disturbance. The patient is not nervous/anxious.         Historical Information   Past Medical History:   Diagnosis Date   • Padmini's gangrene of scrotum     necrotizing fasciitis   • Necrotizing fasciitis ([address])     scrotum     Past Surgical History:   Procedure Laterality Date   • APPENDECTOMY     • KNEE ARTHROSCOPY     • LAPAROSCOPIC CHOLECYSTECTOMY     • SCROTAL SURGERY      multiple surgeries and debribement for scrotal absess and infection     Social History   Social History     Substance and Sexual Activity   Alcohol Use Never     Social History     Substance and Sexual Activity   Drug Use Never     Social History     Tobacco Use   Smoking Status Former   • Types: Cigarettes   • Quit date:    • Years since quittin.9   Smokeless Tobacco Never     Family History:   Family History   Adopted: Yes   Problem Relation Age of Onset   • Obesity Mother    • Obesity Sister    • Obesity Brother    • No Known Problems Son    • Obesity Sister        Meds/Allergies   Current Outpatient Medications   Medication Sig Dispense Refill   • amLODIPine (NORVASC) 10 mg tablet Take 10 mg by mouth daily     • aspirin (ECOTRIN LOW STRENGTH) 81 mg EC tablet Take 81 mg by mouth daily     • atenolol (TENORMIN) 25 mg tablet Take 50 mg by mouth daily     • atorvastatin (LIPITOR) 20 mg tablet Take 20 mg by mouth daily       • B-D ULTRAFINE III SHORT PEN 31G X 8 MM MISC 4 (four) times a day     • Continuous Blood Gluc  (Dexcom G7 ) SCOTTY Use 1 Device continuous 1 each 0   • Continuous Blood Gluc Sensor (Dexcom G7 Sensor) Use 1 Device every 10 days 9 each 3   • losartan-hydrochlorothiazide (HYZAAR) 100-25 MG per tablet Take 1 tablet by mouth daily       • Multiple Vitamins-Minerals (MENS 50+ MULTI VITAMIN/MIN PO) Take by mouth     • Multiple Vitamins-Minerals (OCUVITE ADULT 50+ PO) Take by mouth Take 2 daily     • NovoLOG FlexPen 100 units/mL injection pen 12-22 units per sliding scale at meals 3 times daily     • OneTouch Ultra test strip 3 (three) times a day Test     • torsemide (DEMADEX) 20 mg tablet Take 2 tablets (40 mg total) by mouth daily 180 tablet 1   • Tresiba FlexTouch 200 units/mL CONCENTRATED U-200 injection pen Inject 65 Units under the skin daily at bedtime       No current facility-administered medications for this visit. Allergies   Allergen Reactions   • Penicillins Other (See Comments)     unknown       Objective   Vitals: Blood pressure 146/78, pulse 61, height 6' 2" (1.88 m), weight (!) 159 kg (350 lb), SpO2 96 %. Physical Exam  Vitals and nursing note reviewed. Constitutional:       General: He is not in acute distress. Appearance: Normal appearance. He is not diaphoretic. HENT:      Head: Normocephalic and atraumatic. Eyes:      General: No scleral icterus. Extraocular Movements: Extraocular movements intact. Conjunctiva/sclera: Conjunctivae normal.      Pupils: Pupils are equal, round, and reactive to light. Cardiovascular:      Rate and Rhythm: Normal rate and regular rhythm. Heart sounds: No murmur heard. Pulmonary:      Effort: Pulmonary effort is normal. No respiratory distress. Breath sounds: Normal breath sounds. No wheezing. Musculoskeletal:      Cervical back: Normal range of motion. Right lower leg: Edema present. Left lower leg: Edema present. Lymphadenopathy:      Cervical: No cervical adenopathy. Skin:     General: Skin is warm and dry. Neurological:      Mental Status: He is alert and oriented to person, place, and time. Mental status is at baseline. Sensory: No sensory deficit. Gait: Gait normal.   Psychiatric:         Mood and Affect: Mood normal.         Behavior: Behavior normal.         Thought Content: Thought content normal.         The history was obtained from the review of the chart, patient.     Lab Results:   Lab Results   Component Value Date/Time    Hemoglobin A1C 7.2 (H) 09/11/2023 09:08 AM    Hemoglobin A1C 7.7 04/22/2023 12:00 AM    BUN 29 (H) 09/11/2023 09:08 AM    Potassium 4.6 09/11/2023 09:08 AM    Chloride 108 (H) 09/11/2023 09:08 AM    CO2 20 09/11/2023 09:08 AM    Creatinine 1.21 09/11/2023 09:08 AM    AST 18 09/11/2023 09:08 AM    ALT 23 09/11/2023 09:08 AM    Protein, Total 6.1 09/11/2023 09:08 AM    Albumin 4.0 09/11/2023 09:08 AM    Globulin, Total 2.1 09/11/2023 09:08 AM         Portions of the record may have been created with voice recognition software. Occasional wrong word or "sound a like" substitutions may have occurred due to the inherent limitations of voice recognition software. Read the chart carefully and recognize, using context, where substitutions have occurred.

## 2024-01-07 DIAGNOSIS — E11.65 TYPE 2 DIABETES MELLITUS WITH HYPERGLYCEMIA, WITH LONG-TERM CURRENT USE OF INSULIN (HCC): ICD-10-CM

## 2024-01-07 DIAGNOSIS — Z79.4 TYPE 2 DIABETES MELLITUS WITH HYPERGLYCEMIA, WITH LONG-TERM CURRENT USE OF INSULIN (HCC): ICD-10-CM

## 2024-01-07 DIAGNOSIS — E11.42 DIABETIC POLYNEUROPATHY ASSOCIATED WITH TYPE 2 DIABETES MELLITUS (HCC): ICD-10-CM

## 2024-01-07 DIAGNOSIS — E11.21 DIABETIC NEPHROPATHY ASSOCIATED WITH TYPE 2 DIABETES MELLITUS (HCC): ICD-10-CM

## 2024-01-08 RX ORDER — ACYCLOVIR 400 MG/1
1 TABLET ORAL CONTINUOUS
Qty: 1 EACH | Refills: 0 | Status: SHIPPED | OUTPATIENT
Start: 2024-01-08

## 2024-01-11 DIAGNOSIS — E11.65 TYPE 2 DIABETES MELLITUS WITH HYPERGLYCEMIA, WITH LONG-TERM CURRENT USE OF INSULIN (HCC): Primary | ICD-10-CM

## 2024-01-11 DIAGNOSIS — Z79.4 TYPE 2 DIABETES MELLITUS WITH HYPERGLYCEMIA, WITH LONG-TERM CURRENT USE OF INSULIN (HCC): Primary | ICD-10-CM

## 2024-01-11 RX ORDER — INSULIN ASPART 100 [IU]/ML
INJECTION, SOLUTION INTRAVENOUS; SUBCUTANEOUS
Qty: 75 ML | Refills: 1 | Status: SHIPPED | OUTPATIENT
Start: 2024-01-11

## 2024-01-11 RX ORDER — PEN NEEDLE, DIABETIC 31 GX5/16"
NEEDLE, DISPOSABLE MISCELLANEOUS
Qty: 400 EACH | Refills: 2 | Status: SHIPPED | OUTPATIENT
Start: 2024-01-11

## 2024-01-29 DIAGNOSIS — Z79.4 TYPE 2 DIABETES MELLITUS WITH HYPERGLYCEMIA, WITH LONG-TERM CURRENT USE OF INSULIN (HCC): Primary | ICD-10-CM

## 2024-01-29 DIAGNOSIS — E11.65 TYPE 2 DIABETES MELLITUS WITH HYPERGLYCEMIA, WITH LONG-TERM CURRENT USE OF INSULIN (HCC): Primary | ICD-10-CM

## 2024-01-29 RX ORDER — INSULIN DEGLUDEC 200 U/ML
65 INJECTION, SOLUTION SUBCUTANEOUS
Qty: 36 ML | Refills: 1 | Status: SHIPPED | OUTPATIENT
Start: 2024-01-29

## 2024-04-02 LAB
ALBUMIN SERPL-MCNC: 4 G/DL (ref 3.9–4.9)
ALBUMIN/CREAT UR: 1028 MG/G CREAT (ref 0–29)
ALBUMIN/GLOB SERPL: 1.5 {RATIO} (ref 1.2–2.2)
ALP SERPL-CCNC: 75 IU/L (ref 44–121)
ALT SERPL-CCNC: 25 IU/L (ref 0–44)
AST SERPL-CCNC: 23 IU/L (ref 0–40)
BILIRUB SERPL-MCNC: 0.2 MG/DL (ref 0–1.2)
BUN SERPL-MCNC: 42 MG/DL (ref 8–27)
BUN/CREAT SERPL: 30 (ref 10–24)
CALCIUM SERPL-MCNC: 9.2 MG/DL (ref 8.6–10.2)
CHLORIDE SERPL-SCNC: 106 MMOL/L (ref 96–106)
CHOLEST SERPL-MCNC: 138 MG/DL (ref 100–199)
CO2 SERPL-SCNC: 21 MMOL/L (ref 20–29)
CREAT SERPL-MCNC: 1.41 MG/DL (ref 0.76–1.27)
CREAT UR-MCNC: 79.3 MG/DL
EGFR: 56 ML/MIN/1.73
GLOBULIN SER-MCNC: 2.6 G/DL (ref 1.5–4.5)
GLUCOSE SERPL-MCNC: 145 MG/DL (ref 70–99)
HBA1C MFR BLD: 8.2 % (ref 4.8–5.6)
HDLC SERPL-MCNC: 33 MG/DL
LDLC SERPL CALC-MCNC: 67 MG/DL (ref 0–99)
MICROALBUMIN UR-MCNC: 814.9 UG/ML
POTASSIUM SERPL-SCNC: 4.6 MMOL/L (ref 3.5–5.2)
PROT SERPL-MCNC: 6.6 G/DL (ref 6–8.5)
SL AMB VLDL CHOLESTEROL CALC: 38 MG/DL (ref 5–40)
SODIUM SERPL-SCNC: 141 MMOL/L (ref 134–144)
TRIGL SERPL-MCNC: 235 MG/DL (ref 0–149)
TSH SERPL DL<=0.005 MIU/L-ACNC: 3.18 UIU/ML (ref 0.45–4.5)

## 2024-04-08 ENCOUNTER — OFFICE VISIT (OUTPATIENT)
Dept: ENDOCRINOLOGY | Facility: HOSPITAL | Age: 64
End: 2024-04-08
Payer: COMMERCIAL

## 2024-04-08 VITALS
HEIGHT: 74 IN | BODY MASS INDEX: 40.43 KG/M2 | DIASTOLIC BLOOD PRESSURE: 78 MMHG | SYSTOLIC BLOOD PRESSURE: 138 MMHG | WEIGHT: 315 LBS

## 2024-04-08 DIAGNOSIS — E11.42 DIABETIC POLYNEUROPATHY ASSOCIATED WITH TYPE 2 DIABETES MELLITUS (HCC): ICD-10-CM

## 2024-04-08 DIAGNOSIS — Z79.4 TYPE 2 DIABETES MELLITUS WITH HYPERGLYCEMIA, WITH LONG-TERM CURRENT USE OF INSULIN (HCC): ICD-10-CM

## 2024-04-08 DIAGNOSIS — E11.65 TYPE 2 DIABETES MELLITUS WITH HYPERGLYCEMIA, WITH LONG-TERM CURRENT USE OF INSULIN (HCC): ICD-10-CM

## 2024-04-08 PROCEDURE — 95251 CONT GLUC MNTR ANALYSIS I&R: CPT | Performed by: PHYSICIAN ASSISTANT

## 2024-04-08 PROCEDURE — 99214 OFFICE O/P EST MOD 30 MIN: CPT | Performed by: PHYSICIAN ASSISTANT

## 2024-04-08 RX ORDER — INSULIN DEGLUDEC 200 U/ML
80 INJECTION, SOLUTION SUBCUTANEOUS
Qty: 36 ML | Refills: 1 | Status: SHIPPED | OUTPATIENT
Start: 2024-04-08

## 2024-04-08 NOTE — PROGRESS NOTES
Kevin Meza 64 y.o. male MRN: [id number]    Encounter: [id number]      Assessment/Plan     Assessment:  This is a 64 y.o.-year-old male with type 2 diabetes with neuropathy, hypertension and hyperlipidemia.    Plan:  1.  Type 2 diabetes: Recent hemoglobin A1c was 8.2.  Review of his Dexcom he has very little variability but he is consistently running high.  At this time would like to increase his Tresiba to 74 units daily.  I would like to decrease his NovoLog with breakfast 8 units and increase to 16 units with lunch.  Continue with 16 units at dinner.  Would like to get a download in roughly 2 weeks.  If there is any concerns in the meantime, please contact the office.  Continue with lifestyle modifications to help improve glucose levels.  Contact the office with any concerns or questions.  Follow-up in 3 months with lab work completed prior to visit.     Patient is a type II diabetic currently utilizing for more insulin injections a day.  He is checking glucose levels 3 or more times a day and make adjustments to his insulin based on current glucose levels.  Because of these factors, I think it be beneficial for him to utilize a Dexcom and minimize the risk of future complications.     2.  Diabetic neuropathy: Stable.  Diabetic foot exam is up-to-date.     3.  Diabetic nephropathy: Had significant microalbuminuria, but has improved since last urine sample.  Kidney function remains relatively stable.     4.  Hypertension: Normotensive in the office today.  Kidney function remains relatively stable.  We will repeat CMP prior to next office visit and assess kidney function.     5.  Hyperlipidemia: Lipid panel shows elevated triglycerides which could be due to hyperglycemia.  Rest of the panel was looking excellent.  No adjustments to medication.    CC: Type 2 diabetes follow-up    History of Present Illness     HPI:  Kevin Meza is a 64 year old male with type 2 diabetes, insulin requiring with retinopathy,  neuropathy, nephropathy for 14 years , hypertension, hyperlipidemia for follow-up.  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  Prior to that, he was only on metformin and was poorly managing his diabetes. He is on insulin at home and takes Tresiba Insulin 70 units at bedtime and novolog insulin 12 units with breakfast and lunch and 16 units with dinner via sliding scale, 2 units for every 50 points above 150. He admits to polyuria, polydipsia, and nocturia every 2 and half to 3 hours.  He denies polyphagia and blurry vision.  States recently neuropathy has improved.  He denies chest pain or shortness of breath. He denies heart attack, stroke and claudication but does admit to neuropathy, nephropathy and retinopathy.  Has been making significant lifestyle modifications to help improve glucose levels.  Has lost almost 30 pounds since last office visit.     Hypoglycemic episodes: Rare episodes.  H/o of hypoglycemia causing hospitalization or intervention such as glucagon injection  or ambulance call  No.  Hypoglycemia symptoms: dizziness, sweating and feels lightheaded and off now..  Treatment of hypoglycemia: will eat a piece of candy or drink a juice.  Glucagon:No.  Medic alert tag: recommended,Yes.      The patient's last eye exam was in November 2021 at [facility] eye every 6 weks for retinopathy, gets injections as needed, post lasar surgery twice in the past.  The patient's last foot exam was not recently.Does not see podiatry.  Most recent hemoglobin A1c was completed April 1, 2024 was 8.1.     Blood Sugar/Glucometer/Pump/CGM review: Download of Dexcom from March 26 through April 8, 2024 reveals an average glucose level of 206 with a standard deviation of 44.  He is in target range 31% of the time and above target range 69% the time.  Glucose levels typically stable overnight  come down with breakfast, increase with lunch and a slight bump up with dinner..     He has hyperlipidemia and takes atorvastatin 20 mg daily. He denies chest pain or shortness of breath.     He has hypertension and takes losartan/ HCTZ 100 mg/25 mg daily, Lasix 20 mg daily, and amlodipine 5 mg daily.  He denies headache or stroke-like symptoms.     Review of Systems   Constitutional:  Negative for activity change, appetite change, fatigue and unexpected weight change.   HENT:  Negative for trouble swallowing.    Eyes:  Negative for visual disturbance.   Respiratory:  Negative for chest tightness and shortness of breath.    Cardiovascular:  Negative for chest pain, palpitations and leg swelling.   Gastrointestinal:  Negative for abdominal pain, diarrhea, nausea and vomiting.   Endocrine: Negative for cold intolerance, heat intolerance, polydipsia, polyphagia and polyuria.   Genitourinary:  Negative for frequency.        Nocturia   Skin:  Negative for rash and wound.   Neurological:  Negative for dizziness, weakness, light-headedness, numbness and headaches.   Psychiatric/Behavioral:  Negative for dysphoric mood and sleep disturbance. The patient is not nervous/anxious.        Historical Information   Past Medical History:   Diagnosis Date   • Padmini's gangrene of scrotum     necrotizing fasciitis   • Necrotizing fasciitis (HCC)     scrotum     Past Surgical History:   Procedure Laterality Date   • APPENDECTOMY     • KNEE ARTHROSCOPY     • LAPAROSCOPIC CHOLECYSTECTOMY     • SCROTAL SURGERY      multiple surgeries and debribement for scrotal absess and infection     Social History   Social History     Substance and Sexual Activity   Alcohol Use Never     Social History     Substance and Sexual Activity   Drug Use Never     Social History     Tobacco Use   Smoking Status Former   • Current packs/day: 0.00   • Types: Cigarettes   • Quit date:    • Years since quittin.2   Smokeless Tobacco Never     Family  "History:   Family History   Adopted: Yes   Problem Relation Age of Onset   • Obesity Mother    • Obesity Sister    • Obesity Brother    • No Known Problems Son    • Obesity Sister        Meds/Allergies   Current Outpatient Medications   Medication Sig Dispense Refill   • amLODIPine (NORVASC) 10 mg tablet Take 10 mg by mouth daily     • aspirin (ECOTRIN LOW STRENGTH) 81 mg EC tablet Take 81 mg by mouth daily     • atenolol (TENORMIN) 25 mg tablet Take 50 mg by mouth daily     • atorvastatin (LIPITOR) 20 mg tablet Take 20 mg by mouth daily       • B-D ULTRAFINE III SHORT PEN 31G X 8 MM MISC Use 4 pen needles daily 400 each 2   • Continuous Blood Gluc  (Dexcom G7 ) SCOTTY USE 1 DEVICE CONTINUOUS 1 each 0   • Continuous Blood Gluc Sensor (Dexcom G7 Sensor) Use 1 Device every 10 days 9 each 3   • losartan-hydrochlorothiazide (HYZAAR) 100-25 MG per tablet Take 1 tablet by mouth daily       • Multiple Vitamins-Minerals (MENS 50+ MULTI VITAMIN/MIN PO) Take by mouth     • Multiple Vitamins-Minerals (OCUVITE ADULT 50+ PO) Take by mouth Take 2 daily     • NovoLOG FlexPen 100 units/mL injection pen 12-22 units per sliding scale at meals 3 times daily, use up to 75 units daily 75 mL 1   • OneTouch Ultra test strip 3 (three) times a day Test     • torsemide (DEMADEX) 20 mg tablet Take 2 tablets (40 mg total) by mouth daily 180 tablet 1   • Tresiba FlexTouch 200 units/mL CONCENTRATED U-200 injection pen Inject 80 Units under the skin daily at bedtime 36 mL 1     No current facility-administered medications for this visit.     Allergies   Allergen Reactions   • Penicillins Other (See Comments)     unknown       Objective   Vitals: Blood pressure 138/78, height 6' 2\" (1.88 m), weight (!) 147 kg (323 lb).    Physical Exam  Vitals and nursing note reviewed.   Constitutional:       General: He is not in acute distress.     Appearance: Normal appearance. He is not diaphoretic.   HENT:      Head: Normocephalic and " atraumatic.   Eyes:      General: No scleral icterus.     Extraocular Movements: Extraocular movements intact.      Conjunctiva/sclera: Conjunctivae normal.      Pupils: Pupils are equal, round, and reactive to light.   Cardiovascular:      Rate and Rhythm: Normal rate and regular rhythm.      Heart sounds: No murmur heard.  Pulmonary:      Effort: Pulmonary effort is normal. No respiratory distress.      Breath sounds: Normal breath sounds. No wheezing.   Musculoskeletal:      Cervical back: Normal range of motion.      Right lower leg: No edema.      Left lower leg: No edema.   Lymphadenopathy:      Cervical: No cervical adenopathy.   Skin:     General: Skin is warm and dry.   Neurological:      Mental Status: He is alert and oriented to person, place, and time. Mental status is at baseline.      Sensory: No sensory deficit.      Gait: Gait normal.   Psychiatric:         Mood and Affect: Mood normal.         Behavior: Behavior normal.         Thought Content: Thought content normal.         The history was obtained from the review of the chart, patient.    Lab Results:    Latest Reference Range & Units 04/01/24 08:26   Sodium 134 - 144 mmol/L 141   Potassium 3.5 - 5.2 mmol/L 4.6   Chloride 96 - 106 mmol/L 106   Carbon Dioxide 20 - 29 mmol/L 21   BUN 8 - 27 mg/dL 42 (H)   Creatinine 0.76 - 1.27 mg/dL 1.41 (H)   SL AMB BUN/CREATININE RATIO 10 - 24  30 (H)   GLUCOSE 70 - 99 mg/dL 145 (H)   AST 0 - 40 IU/L 23   ALT 0 - 44 IU/L 25   Total Protein 6.0 - 8.5 g/dL 6.6   Albumin 3.9 - 4.9 g/dL 4.0   Total Bilirubin 0.0 - 1.2 mg/dL 0.2   GFR, Calculated >59 mL/min/1.73 56 (L)   Albumin/Globulin Ratio 1.2 - 2.2  1.5   Cholesterol 100 - 199 mg/dL 138   Triglycerides 0 - 149 mg/dL 235 (H)   HDL >39 mg/dL 33 (L)   LDL Calculated 0 - 99 mg/dL 67   VLDL Cholesterol [place] 5 - 40 mg/dL 38   ALKALINE PHOSPHATASE ISOENZYMES 44 - 121 IU/L 75   Globulin, Total 1.5 - 4.5 g/dL 2.6   Hemoglobin A1C 4.8 - 5.6 % 8.2 (H)   CALCIUM 8.6 - 10.2  "mg/dL 9.2   TSH, POC 0.450 - 4.500 uIU/mL 3.180   EXT Creatinine Urine Not Estab. mg/dL 79.3   Albumin Creat Ratio 0 - 29 mg/g creat 1,028 (H)   Albumin,U,Random Not Estab. ug/mL 814.9   (H): Data is abnormally high  (L): Data is abnormally low      Portions of the record may have been created with voice recognition software. Occasional wrong word or \"sound a like\" substitutions may have occurred due to the inherent limitations of voice recognition software. Read the chart carefully and recognize, using context, where substitutions have occurred.    "

## 2024-04-08 NOTE — PATIENT INSTRUCTIONS
Monitor diet and maintain physical activity.     Increase Tresiba to 74 units daily. Decrease Novolog 8 units with breakfast. Increase to 16 units with lunch and dinner plus sliding scale.     Send in blood sugar logs in 2 weeks.     Contact the office with any concerns or questions.     Use Dexcom to monitor blood sugars.     Follow-up in 3 months with lab work completed prior to visit.

## 2024-04-10 DIAGNOSIS — E11.21 DIABETIC NEPHROPATHY ASSOCIATED WITH TYPE 2 DIABETES MELLITUS (HCC): ICD-10-CM

## 2024-04-10 DIAGNOSIS — E11.42 DIABETIC POLYNEUROPATHY ASSOCIATED WITH TYPE 2 DIABETES MELLITUS (HCC): ICD-10-CM

## 2024-04-10 DIAGNOSIS — Z79.4 TYPE 2 DIABETES MELLITUS WITH HYPERGLYCEMIA, WITH LONG-TERM CURRENT USE OF INSULIN (HCC): ICD-10-CM

## 2024-04-10 DIAGNOSIS — E11.65 TYPE 2 DIABETES MELLITUS WITH HYPERGLYCEMIA, WITH LONG-TERM CURRENT USE OF INSULIN (HCC): ICD-10-CM

## 2024-04-10 RX ORDER — ACYCLOVIR 400 MG/1
1 TABLET ORAL CONTINUOUS
Qty: 1 EACH | Refills: 0 | Status: SHIPPED | OUTPATIENT
Start: 2024-04-10

## 2024-06-04 DIAGNOSIS — E11.21 DIABETIC NEPHROPATHY ASSOCIATED WITH TYPE 2 DIABETES MELLITUS (HCC): ICD-10-CM

## 2024-06-04 DIAGNOSIS — E11.42 DIABETIC POLYNEUROPATHY ASSOCIATED WITH TYPE 2 DIABETES MELLITUS (HCC): ICD-10-CM

## 2024-06-04 DIAGNOSIS — Z79.4 TYPE 2 DIABETES MELLITUS WITH HYPERGLYCEMIA, WITH LONG-TERM CURRENT USE OF INSULIN (HCC): ICD-10-CM

## 2024-06-04 DIAGNOSIS — E11.65 TYPE 2 DIABETES MELLITUS WITH HYPERGLYCEMIA, WITH LONG-TERM CURRENT USE OF INSULIN (HCC): ICD-10-CM

## 2024-06-04 RX ORDER — ACYCLOVIR 400 MG/1
1 TABLET ORAL
Qty: 9 EACH | Refills: 1 | Status: SHIPPED | OUTPATIENT
Start: 2024-06-04

## 2024-06-07 DIAGNOSIS — R60.9 EDEMA, UNSPECIFIED TYPE: ICD-10-CM

## 2024-06-07 RX ORDER — TORSEMIDE 20 MG/1
40 TABLET ORAL DAILY
Qty: 180 TABLET | Refills: 1 | Status: SHIPPED | OUTPATIENT
Start: 2024-06-07

## 2024-07-18 LAB
ALBUMIN SERPL-MCNC: 4.3 G/DL (ref 3.9–4.9)
ALP SERPL-CCNC: 70 IU/L (ref 44–121)
ALT SERPL-CCNC: 21 IU/L (ref 0–44)
AST SERPL-CCNC: 16 IU/L (ref 0–40)
BILIRUB SERPL-MCNC: 0.3 MG/DL (ref 0–1.2)
BUN SERPL-MCNC: 44 MG/DL (ref 8–27)
BUN/CREAT SERPL: 29 (ref 10–24)
CALCIUM SERPL-MCNC: 9.4 MG/DL (ref 8.6–10.2)
CHLORIDE SERPL-SCNC: 105 MMOL/L (ref 96–106)
CO2 SERPL-SCNC: 24 MMOL/L (ref 20–29)
CREAT SERPL-MCNC: 1.5 MG/DL (ref 0.76–1.27)
EGFR: 52 ML/MIN/1.73
GLOBULIN SER-MCNC: 2.3 G/DL (ref 1.5–4.5)
GLUCOSE SERPL-MCNC: 159 MG/DL (ref 70–99)
HBA1C MFR BLD: 6.8 % (ref 4.8–5.6)
POTASSIUM SERPL-SCNC: 5 MMOL/L (ref 3.5–5.2)
PROT SERPL-MCNC: 6.6 G/DL (ref 6–8.5)
SODIUM SERPL-SCNC: 142 MMOL/L (ref 134–144)

## 2024-07-19 ENCOUNTER — OFFICE VISIT (OUTPATIENT)
Dept: ENDOCRINOLOGY | Facility: HOSPITAL | Age: 64
End: 2024-07-19
Payer: COMMERCIAL

## 2024-07-19 VITALS
DIASTOLIC BLOOD PRESSURE: 80 MMHG | HEART RATE: 54 BPM | BODY MASS INDEX: 40.43 KG/M2 | HEIGHT: 74 IN | SYSTOLIC BLOOD PRESSURE: 146 MMHG | WEIGHT: 315 LBS

## 2024-07-19 DIAGNOSIS — Z79.4 TYPE 2 DIABETES MELLITUS WITH HYPERGLYCEMIA, WITH LONG-TERM CURRENT USE OF INSULIN (HCC): Primary | ICD-10-CM

## 2024-07-19 DIAGNOSIS — E11.65 TYPE 2 DIABETES MELLITUS WITH HYPERGLYCEMIA, WITH LONG-TERM CURRENT USE OF INSULIN (HCC): Primary | ICD-10-CM

## 2024-07-19 DIAGNOSIS — E78.2 MIXED HYPERLIPIDEMIA: ICD-10-CM

## 2024-07-19 PROCEDURE — 99214 OFFICE O/P EST MOD 30 MIN: CPT | Performed by: PHYSICIAN ASSISTANT

## 2024-07-19 NOTE — PATIENT INSTRUCTIONS
Monitor diet and maintain physical activity.     Increase Tresiba to 80 units daily. Decrease Novolog 14-16 units with breakfast.     Send in blood sugar logs in 2 weeks.     Contact the office with any concerns or questions.     Use Dexcom to monitor blood sugars.     Follow-up in 3 months with lab work completed prior to visit

## 2024-07-19 NOTE — PROGRESS NOTES
Kevin Meza 64 y.o. male MRN: [id number]    Encounter: [id number]      Assessment & Plan     Assessment:  This is a 64 y.o.-year-old male with type 2 diabetes with neuropathy, hypertension and hyperlipidemia.    Plan:  1.  Type 2 diabetes: Recent hemoglobin A1c was 6.8. Dexcom is doing much better at this time.  Would like to tweak Tresiba to 80 units daily. Decrease Novolog by 4 units with breakfast. Continue all other insulin at this time.  Is interested in trying Ozempic in the future.  If there is any concerns in the meantime, please contact the office.  Continue with lifestyle modifications to help improve glucose levels.  Contact the office with any concerns or questions.  Follow-up in 3 months with lab work completed prior to visit.     Patient is a type II diabetic currently utilizing for more insulin injections a day.  He is checking glucose levels 3 or more times a day and make adjustments to his insulin based on current glucose levels.  Because of these factors, I think it be beneficial for him to utilize a Dexcom and minimize the risk of future complications.     2.  Diabetic neuropathy: Stable.  Diabetic foot exam is up-to-date.     3.  Diabetic nephropathy: Had significant microalbuminuria, but has improved since last urine sample.  Kidney function remains relatively stable.  Would like to repeat prior to next office visit.     4.  Hypertension: Slightly elevated at this time.  Kidney function remains relatively stable.  We will repeat CMP prior to next office visit and assess kidney function.     5.  Hyperlipidemia: Lipid panel shows elevated triglycerides which could be due to hyperglycemia.  Rest of the panel was looking excellent.  No adjustments to medication.    CC: Type 2 diabetes follow-up    History of Present Illness     HPI:  Kevin Meza is a 64 year old male with type 2 diabetes, insulin requiring with retinopathy, neuropathy, nephropathy for 14 years , hypertension, hyperlipidemia for  follow-up.  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  Prior to that, he was only on metformin and was poorly managing his diabetes. He is on insulin at home and takes Tresiba Insulin 76 units at bedtime and novolog insulin 18-20 units with meals and sliding scale, 2 units for every 50 points above 150. Typically will take no more than 24 units.  He admits to polyuria, polydipsia, and nocturia every 2 and half to 3 hours.  He denies polyphagia and blurry vision.  States recently neuropathy has improved.  He denies chest pain or shortness of breath. He denies heart attack, stroke and claudication but does admit to neuropathy, nephropathy and retinopathy.  Has been making significant lifestyle modifications to help improve glucose levels.  Weight is stable since last office visit. Does have some interest in trying Ozempic.      Hypoglycemic episodes: Rare episodes.  H/o of hypoglycemia causing hospitalization or intervention such as glucagon injection  or ambulance call  No.  Hypoglycemia symptoms: dizziness, sweating and feels lightheaded and off now..  Treatment of hypoglycemia: will eat a piece of candy or drink a juice.  Glucagon:No.  Medic alert tag: recommended,Yes.      The patient's last eye exam was in November 2021 at [facility] eye every 6 weks for retinopathy, gets injections as needed, post lasar surgery twice in the past.  The patient's last foot exam was not recently.Does not see podiatry.  Most recent hemoglobin A1c was completed July 17, 2024 was 6.8.     Blood Sugar/Glucometer/Pump/CGM review: Download of Dexcom from July 6 through July 19, 2024 was 162 with a standard deviation 41. Glucose levels a little higher over night and trends down.  Will have a drop before lunch and then increase in the afternoon.     He has hyperlipidemia and takes atorvastatin 20 mg  daily. He denies chest pain or shortness of breath.     He has hypertension and takes losartan/ HCTZ 100 mg/25 mg daily, Lasix 20 mg daily, and amlodipine 5 mg daily.  He denies headache or stroke-like symptoms.     Review of Systems   Constitutional:  Negative for activity change, appetite change, fatigue and unexpected weight change.   HENT:  Negative for trouble swallowing.    Eyes:  Negative for visual disturbance.   Respiratory:  Negative for chest tightness and shortness of breath.    Cardiovascular:  Negative for chest pain, palpitations and leg swelling.   Gastrointestinal:  Negative for abdominal pain, diarrhea, nausea and vomiting.   Endocrine: Negative for cold intolerance, heat intolerance, polydipsia, polyphagia and polyuria.   Genitourinary:  Negative for frequency.        Nocturia   Skin:  Negative for rash and wound.   Neurological:  Negative for dizziness, weakness, light-headedness, numbness and headaches.   Psychiatric/Behavioral:  Negative for dysphoric mood and sleep disturbance. The patient is not nervous/anxious.        Historical Information   Past Medical History:   Diagnosis Date   • Padmini's gangrene of scrotum     necrotizing fasciitis   • Necrotizing fasciitis (HCC)     scrotum     Past Surgical History:   Procedure Laterality Date   • APPENDECTOMY     • KNEE ARTHROSCOPY     • LAPAROSCOPIC CHOLECYSTECTOMY     • SCROTAL SURGERY      multiple surgeries and debribement for scrotal absess and infection     Social History   Social History     Substance and Sexual Activity   Alcohol Use Never     Social History     Substance and Sexual Activity   Drug Use Never     Social History     Tobacco Use   Smoking Status Former   • Current packs/day: 0.00   • Types: Cigarettes   • Quit date:    • Years since quittin.5   Smokeless Tobacco Never     Family History:   Family History   Adopted: Yes   Problem Relation Age of Onset   • Obesity Mother    • Obesity Sister    • Obesity Brother    •  "No Known Problems Son    • Obesity Sister        Meds/Allergies   Current Outpatient Medications   Medication Sig Dispense Refill   • amLODIPine (NORVASC) 10 mg tablet Take 10 mg by mouth daily     • aspirin (ECOTRIN LOW STRENGTH) 81 mg EC tablet Take 81 mg by mouth daily     • atenolol (TENORMIN) 25 mg tablet Take 50 mg by mouth daily     • atorvastatin (LIPITOR) 20 mg tablet Take 20 mg by mouth daily       • B-D ULTRAFINE III SHORT PEN 31G X 8 MM MISC Use 4 pen needles daily 400 each 2   • Continuous Blood Gluc  (Dexcom G7 ) SCOTTY USE 1 DEVICE CONTINUOUS 1 each 0   • Continuous Glucose Sensor (Dexcom G7 Sensor) Use 1 Device every 10 days 9 each 1   • losartan-hydrochlorothiazide (HYZAAR) 100-25 MG per tablet Take 1 tablet by mouth daily       • Multiple Vitamins-Minerals (MENS 50+ MULTI VITAMIN/MIN PO) Take by mouth     • Multiple Vitamins-Minerals (OCUVITE ADULT 50+ PO) Take by mouth Take 2 daily     • NovoLOG FlexPen 100 units/mL injection pen 12-22 units per sliding scale at meals 3 times daily, use up to 75 units daily 75 mL 1   • OneTouch Ultra test strip 3 (three) times a day Test     • torsemide (DEMADEX) 20 mg tablet TAKE 2 TABLETS (40 MG TOTAL) BY MOUTH DAILY. 180 tablet 1   • Tresiba FlexTouch 200 units/mL CONCENTRATED U-200 injection pen Inject 80 Units under the skin daily at bedtime (Patient taking differently: Inject 76 Units under the skin daily at bedtime) 36 mL 1     No current facility-administered medications for this visit.     Allergies   Allergen Reactions   • Penicillins Other (See Comments)     unknown       Objective   Vitals: Blood pressure 146/80, pulse (!) 54, height 6' 2\" (1.88 m), weight (!) 149 kg (327 lb 9.6 oz).    Physical Exam  Vitals and nursing note reviewed.   Constitutional:       General: He is not in acute distress.     Appearance: Normal appearance. He is not diaphoretic.   HENT:      Head: Normocephalic and atraumatic.   Eyes:      General: No scleral " icterus.     Pupils: Pupils are equal, round, and reactive to light.   Cardiovascular:      Rate and Rhythm: Normal rate and regular rhythm.      Heart sounds: No murmur heard.  Pulmonary:      Effort: Pulmonary effort is normal. No respiratory distress.      Breath sounds: Normal breath sounds. No wheezing.   Musculoskeletal:      Right lower leg: No edema.      Left lower leg: No edema.   Lymphadenopathy:      Cervical: No cervical adenopathy.   Skin:     General: Skin is warm and dry.   Neurological:      Mental Status: He is alert and oriented to person, place, and time.      Sensory: No sensory deficit.   Psychiatric:         Mood and Affect: Mood normal.         Behavior: Behavior normal.         Thought Content: Thought content normal.         The history was obtained from the review of the chart, patient.    Lab Results:   Lab Results   Component Value Date/Time    Hemoglobin A1C 6.8 (H) 07/17/2024 08:10 AM    Hemoglobin A1C 8.2 (H) 04/01/2024 08:26 AM    Hemoglobin A1C 7.2 (H) 09/11/2023 09:08 AM    BUN 44 (H) 07/17/2024 08:10 AM    BUN 42 (H) 04/01/2024 08:26 AM    BUN 29 (H) 09/11/2023 09:08 AM    Potassium 5.0 07/17/2024 08:10 AM    Potassium 4.6 04/01/2024 08:26 AM    Potassium 4.6 09/11/2023 09:08 AM    Chloride 105 07/17/2024 08:10 AM    Chloride 106 04/01/2024 08:26 AM    Chloride 108 (H) 09/11/2023 09:08 AM    CO2 24 07/17/2024 08:10 AM    CO2 21 04/01/2024 08:26 AM    CO2 20 09/11/2023 09:08 AM    Creatinine 1.50 (H) 07/17/2024 08:10 AM    Creatinine 1.41 (H) 04/01/2024 08:26 AM    Creatinine 1.21 09/11/2023 09:08 AM    AST 16 07/17/2024 08:10 AM    AST 23 04/01/2024 08:26 AM    AST 18 09/11/2023 09:08 AM    ALT 21 07/17/2024 08:10 AM    ALT 25 04/01/2024 08:26 AM    ALT 23 09/11/2023 09:08 AM    Protein, Total 6.6 07/17/2024 08:10 AM    Protein, Total 6.6 04/01/2024 08:26 AM    Protein, Total 6.1 09/11/2023 09:08 AM    Albumin 4.3 07/17/2024 08:10 AM    Albumin 4.0 04/01/2024 08:26 AM    Albumin  "4.0 09/11/2023 09:08 AM    Globulin, Total 2.3 07/17/2024 08:10 AM    Globulin, Total 2.6 04/01/2024 08:26 AM    Globulin, Total 2.1 09/11/2023 09:08 AM    HDL 33 (L) 04/01/2024 08:26 AM    Triglycerides 235 (H) 04/01/2024 08:26 AM         Portions of the record may have been created with voice recognition software. Occasional wrong word or \"sound a like\" substitutions may have occurred due to the inherent limitations of voice recognition software. Read the chart carefully and recognize, using context, where substitutions have occurred.    "

## 2024-07-22 ENCOUNTER — TELEPHONE (OUTPATIENT)
Dept: ADMINISTRATIVE | Facility: OTHER | Age: 64
End: 2024-07-22

## 2024-07-22 NOTE — TELEPHONE ENCOUNTER
Upon review of the In Basket request and the patient's chart, initial outreach has been made via fax to facility. Please see Contacts section for details.     Thank you  Dyana Mcmahon

## 2024-07-22 NOTE — LETTER
Diabetic Eye Exam Form    Date Requested: 24  Patient: Kevin Meza  Patient : [id number]   Referring Provider: JANINE Rojas      DIABETIC Eye Exam Date _______________________________      Type of Exam MUST be documented for Diabetic Eye Exams. Please CHECK ONE.     Retinal Exam       Dilated Retinal Exam       OCT       Optomap-Iris Exam      Fundus Photography       Left Eye - Please check Retinopathy or No Retinopathy        Exam did show retinopathy    Exam did not show retinopathy       Right Eye - Please check Retinopathy or No Retinopathy       Exam did show retinopathy    Exam did not show retinopathy       Comments __________________________________________________________    Practice Providing Exam ______________________________________________    Exam Performed By (print name) _______________________________________      Provider Signature ___________________________________________________      These reports are needed for  compliance.  Please fax this completed form and a copy of the Diabetic Eye Exam report to our office located at [address] [place] [address] as soon as possible via Fax [phone] kelsy Turpin: Phone [phone]  We thank you for your assistance in treating our mutual patient.

## 2024-07-22 NOTE — TELEPHONE ENCOUNTER
----- Message from Rosio HERNANDEZ sent at 7/19/2024  2:00 PM EDT -----  Regarding: DM EYE EXAM  07/19/24 2:00 PM    Hello, our patient Kevin Meza has had a DM Eye Exam performed at [facility]. Their number is [phone].    Thank you,  Rosio Carey PG [facility] [place]

## 2024-08-16 ENCOUNTER — TELEPHONE (OUTPATIENT)
Dept: ENDOCRINOLOGY | Facility: HOSPITAL | Age: 64
End: 2024-08-16

## 2024-08-16 NOTE — TELEPHONE ENCOUNTER
Patient calling for refill of G7 sensors. Reviewed chart with patient and made aware that 90 day with 1 RF was sent to his pharmacy on 6/4/24. He is going to follow up with pharmacy.

## 2024-09-30 DIAGNOSIS — Z79.4 TYPE 2 DIABETES MELLITUS WITH HYPERGLYCEMIA, WITH LONG-TERM CURRENT USE OF INSULIN (HCC): ICD-10-CM

## 2024-09-30 DIAGNOSIS — E11.65 TYPE 2 DIABETES MELLITUS WITH HYPERGLYCEMIA, WITH LONG-TERM CURRENT USE OF INSULIN (HCC): ICD-10-CM

## 2024-09-30 RX ORDER — INSULIN ASPART 100 [IU]/ML
INJECTION, SOLUTION INTRAVENOUS; SUBCUTANEOUS
Qty: 75 ML | Refills: 1 | Status: SHIPPED | OUTPATIENT
Start: 2024-09-30

## 2024-09-30 NOTE — TELEPHONE ENCOUNTER
Reason for call:   [x] Refill   [] Prior Auth  [] Other:     Office:   [] PCP/Provider -   [x] Specialty/Provider - [facility]     Medication: NovoLOG FlexPen 100 units/mL injection pen     Dose/Frequency: 12-22 units per sliding scale at meals 3 times daily, use up to 75 units daily,     Quantity: 75 mL    Pharmacy: [facility] #[id number]    Does the patient have enough for 3 days?   [x] Yes   [] No - Send as HP to POD

## 2024-10-14 DIAGNOSIS — E11.65 TYPE 2 DIABETES MELLITUS WITH HYPERGLYCEMIA, WITH LONG-TERM CURRENT USE OF INSULIN (HCC): ICD-10-CM

## 2024-10-14 DIAGNOSIS — Z79.4 TYPE 2 DIABETES MELLITUS WITH HYPERGLYCEMIA, WITH LONG-TERM CURRENT USE OF INSULIN (HCC): ICD-10-CM

## 2024-10-14 NOTE — TELEPHONE ENCOUNTER
Reason for call:   [x] Refill   [] Prior Auth  [] Other:     Office:   [] PCP/Provider -   [x] Specialty/Provider - [facility]  Authorized By: Christophe Nieto PA-C    Medication: Tresiba FlexTouch 200 units/mL CONCENTRATED U-200 injection pen     Dose/Frequency: Inject 80 Units under the skin daily at bedtime     Quantity: 36 mL     Pharmacy: [facility]/pharmacy #[id number] - [phone]      Does the patient have enough for 3 days?   [x] Yes   [] No - Send as HP to POD

## 2024-10-15 RX ORDER — INSULIN DEGLUDEC 200 U/ML
80 INJECTION, SOLUTION SUBCUTANEOUS
Qty: 36 ML | Refills: 1 | Status: SHIPPED | OUTPATIENT
Start: 2024-10-15

## 2024-10-17 DIAGNOSIS — E11.21 DIABETIC NEPHROPATHY ASSOCIATED WITH TYPE 2 DIABETES MELLITUS (HCC): ICD-10-CM

## 2024-10-17 DIAGNOSIS — E11.42 DIABETIC POLYNEUROPATHY ASSOCIATED WITH TYPE 2 DIABETES MELLITUS (HCC): ICD-10-CM

## 2024-10-17 DIAGNOSIS — E11.65 TYPE 2 DIABETES MELLITUS WITH HYPERGLYCEMIA, WITH LONG-TERM CURRENT USE OF INSULIN (HCC): ICD-10-CM

## 2024-10-17 DIAGNOSIS — Z79.4 TYPE 2 DIABETES MELLITUS WITH HYPERGLYCEMIA, WITH LONG-TERM CURRENT USE OF INSULIN (HCC): ICD-10-CM

## 2024-10-17 RX ORDER — ACYCLOVIR 400 MG/1
1 TABLET ORAL
Qty: 3 EACH | Refills: 0 | Status: SHIPPED | OUTPATIENT
Start: 2024-10-17

## 2024-12-04 DIAGNOSIS — R60.9 EDEMA, UNSPECIFIED TYPE: ICD-10-CM

## 2024-12-04 RX ORDER — TORSEMIDE 20 MG/1
40 TABLET ORAL DAILY
Qty: 180 TABLET | Refills: 1 | Status: SHIPPED | OUTPATIENT
Start: 2024-12-04

## 2025-01-02 ENCOUNTER — TELEPHONE (OUTPATIENT)
Dept: ENDOCRINOLOGY | Facility: HOSPITAL | Age: 65
End: 2025-01-02

## 2025-01-02 ENCOUNTER — OFFICE VISIT (OUTPATIENT)
Dept: ENDOCRINOLOGY | Facility: HOSPITAL | Age: 65
End: 2025-01-02
Payer: COMMERCIAL

## 2025-01-02 VITALS
BODY MASS INDEX: 40.43 KG/M2 | SYSTOLIC BLOOD PRESSURE: 154 MMHG | HEIGHT: 74 IN | WEIGHT: 315 LBS | OXYGEN SATURATION: 99 % | DIASTOLIC BLOOD PRESSURE: 76 MMHG | HEART RATE: 56 BPM

## 2025-01-02 DIAGNOSIS — Z79.4 TYPE 2 DIABETES MELLITUS WITH HYPERGLYCEMIA, WITH LONG-TERM CURRENT USE OF INSULIN (HCC): ICD-10-CM

## 2025-01-02 DIAGNOSIS — Z79.4 TYPE 2 DIABETES MELLITUS WITH HYPERGLYCEMIA, WITH LONG-TERM CURRENT USE OF INSULIN (HCC): Primary | ICD-10-CM

## 2025-01-02 DIAGNOSIS — E11.65 TYPE 2 DIABETES MELLITUS WITH HYPERGLYCEMIA, WITH LONG-TERM CURRENT USE OF INSULIN (HCC): ICD-10-CM

## 2025-01-02 DIAGNOSIS — E11.65 TYPE 2 DIABETES MELLITUS WITH HYPERGLYCEMIA, WITH LONG-TERM CURRENT USE OF INSULIN (HCC): Primary | ICD-10-CM

## 2025-01-02 PROCEDURE — 99214 OFFICE O/P EST MOD 30 MIN: CPT | Performed by: PHYSICIAN ASSISTANT

## 2025-01-02 PROCEDURE — 95251 CONT GLUC MNTR ANALYSIS I&R: CPT | Performed by: PHYSICIAN ASSISTANT

## 2025-01-02 RX ORDER — INSULIN LISPRO 100 [IU]/ML
INJECTION, SOLUTION INTRAVENOUS; SUBCUTANEOUS
Qty: 30 ML | Refills: 2 | Status: SHIPPED | OUTPATIENT
Start: 2025-01-02

## 2025-01-02 RX ORDER — BLOOD-GLUCOSE METER
EACH MISCELLANEOUS
Qty: 1 KIT | Refills: 0 | Status: SHIPPED | OUTPATIENT
Start: 2025-01-02

## 2025-01-02 RX ORDER — BLOOD SUGAR DIAGNOSTIC
STRIP MISCELLANEOUS
Qty: 300 STRIP | Refills: 3 | Status: SHIPPED | OUTPATIENT
Start: 2025-01-02

## 2025-01-02 RX ORDER — LANCETS 33 GAUGE
EACH MISCELLANEOUS
Qty: 300 EACH | Refills: 3 | Status: SHIPPED | OUTPATIENT
Start: 2025-01-02

## 2025-01-02 RX ORDER — INSULIN DEGLUDEC 200 U/ML
80 INJECTION, SOLUTION SUBCUTANEOUS
Qty: 36 ML | Refills: 1 | Status: SHIPPED | OUTPATIENT
Start: 2025-01-02

## 2025-01-02 NOTE — TELEPHONE ENCOUNTER
Reason for call:   [x] Refill   [] Prior Auth  [] Other:     Office:   [] PCP/Provider -   [x] Specialty/Provider - [facility] [place]     Medication: One touch Glucometer kit with test strips and lancets       Pharmacy: [facility] [place]     Does the patient have enough for 3 days?   [] Yes   [x] No - Send as HP to POD

## 2025-01-02 NOTE — PATIENT INSTRUCTIONS
Monitor diet and maintain physical activity.     Continue Tresiba to 80 units daily. Continue with same dose of Humalog.     Send in blood sugar logs in 2 weeks.     Contact the office with any concerns or questions.     Use Dexcom to monitor blood sugars.     Follow-up in 3 months with lab work completed prior to visit

## 2025-01-02 NOTE — PROGRESS NOTES
Kevin Meza 64 y.o. male MRN: [id number]    Encounter: [id number]      Assessment & Plan     Assessment:  This is a 64 y.o.-year-old male with type 2 diabetes with neuropathy, hypertension and hyperlipidemia.    Plan:  1.  Type 2 diabetes: No lab work completed prior to today's office visit.  Unfortunately has had some sensory issues with his Dexcom, but with the information I do have he is doing fine.  Might have some hyperglycemia recently due to the holidays.  At this time he believes his blood sugars are doing much better after his last insulin adjustments so he will continue with Tresiba 80 units daily and NovoLog 14 to 16 units with breakfast, with 20 units at lunch and dinner.  Continue utilizing Dexcom to monitor glucose levels.  If there are still issues after changing the sensor, we may need to get a new .  Contact the office with any concerns or questions.  Follow-up in 3 months with labwork completed prior to visit.     Patient is a type II diabetic currently utilizing for more insulin injections a day.  He is checking glucose levels 3 or more times a day and make adjustments to his insulin based on current glucose levels.  Because of these factors, I think it be beneficial for him to utilize a Dexcom and minimize the risk of future complications.     2.  Diabetic neuropathy: Stable.  Diabetic foot exam is up-to-date.     3.  Diabetic nephropathy: Had significant microalbuminuria, but has improved since last urine sample.  Kidney function remains relatively stable.  Would like to repeat prior to next office visit.     4.  Hypertension: Slightly elevated at this time.  Kidney function remains relatively stable.  We will repeat CMP prior to next office visit and assess kidney function.     5.  Hyperlipidemia: Lipid panel shows elevated triglycerides which could be due to hyperglycemia.  Rest of the panel was looking excellent.  No adjustments to medication.  We will continue to monitor over  time.    CC: Type 2 diabetes follow-up    History of Present Illness     HPI:  Kevin Meza is a 64 year old male with type 2 diabetes, insulin requiring with retinopathy, neuropathy, nephropathy for 14 years , hypertension, hyperlipidemia for follow-up.  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  Prior to that, he was only on metformin and was poorly managing his diabetes. He is on insulin at home and takes Tresiba Insulin 80 units at bedtime and novolog insulin 14 to 16 units with breakfast and 18-20 units with lunch and dinner and sliding scale, 2 units for every 50 points above 150. Typically will take no more than 24 units.  He admits to polyuria, polydipsia, and nocturia every 2 and half to 3 hours.  He denies polyphagia and blurry vision.  States recently neuropathy has improved.  He denies chest pain or shortness of breath. He denies heart attack, stroke and claudication but does admit to neuropathy, nephropathy and retinopathy.  Has been making significant lifestyle modifications to help improve glucose levels.  Unfortunately has gained 15 pounds since last office visit.  Does believe the insulin adjustments have been beneficial for his overall glucose levels.  Unfortunately has had issues with his Dexcom sensor recently.     Hypoglycemic episodes: Rare episodes.  H/o of hypoglycemia causing hospitalization or intervention such as glucagon injection  or ambulance call  No.  Hypoglycemia symptoms: dizziness, sweating and feels lightheaded and off now..  Treatment of hypoglycemia: will eat a piece of candy or drink a juice.  Glucagon:No.  Medic alert tag: recommended,Yes.      The patient's last eye exam was in November 2021 at [facility] eye every 6 weks for retinopathy, gets injections as needed, post lasar surgery twice in the past.  The patient's last foot exam was not  recently.Does not see podiatry.  Most recent hemoglobin A1c was completed July 17, 2024 was 6.8.     Blood Sugar/Glucometer/Pump/CGM review: Unfortunately his Dexcom had the wrong date on it and results are not accurate as there are 2 sets of data for each day.  That being said in general glucose levels seem to trend down overnight, increase slightly with breakfast and then remain relatively stable throughout the day.  As stated above he has not had any major issues with his glucose levels recently.     He has hyperlipidemia and takes atorvastatin 20 mg daily. He denies chest pain or shortness of breath.     He has hypertension and takes losartan/ HCTZ 100 mg/25 mg daily, Lasix 20 mg daily, and amlodipine 5 mg daily.  He denies headache or stroke-like symptoms.     Review of Systems   Constitutional:  Negative for activity change, appetite change, fatigue and unexpected weight change.   HENT:  Negative for trouble swallowing.    Eyes:  Negative for visual disturbance.   Respiratory:  Negative for chest tightness and shortness of breath.    Cardiovascular:  Negative for chest pain, palpitations and leg swelling.   Gastrointestinal:  Negative for abdominal pain, diarrhea, nausea and vomiting.   Endocrine: Negative for cold intolerance, heat intolerance, polydipsia, polyphagia and polyuria.   Genitourinary:  Negative for frequency.        Nocturia   Skin:  Negative for rash and wound.   Neurological:  Negative for dizziness, weakness, light-headedness, numbness and headaches.   Psychiatric/Behavioral:  Negative for dysphoric mood and sleep disturbance. The patient is not nervous/anxious.        Historical Information   Past Medical History:   Diagnosis Date    Padmini's gangrene of scrotum     necrotizing fasciitis    Necrotizing fasciitis (HCC)     scrotum     Past Surgical History:   Procedure Laterality Date    APPENDECTOMY      KNEE ARTHROSCOPY      LAPAROSCOPIC CHOLECYSTECTOMY      SCROTAL SURGERY      multiple  surgeries and debribement for scrotal absess and infection     Social History   Social History     Substance and Sexual Activity   Alcohol Use Never     Social History     Substance and Sexual Activity   Drug Use Never     Social History     Tobacco Use   Smoking Status Former    Current packs/day: 0.00    Types: Cigarettes    Quit date:     Years since quittin.0   Smokeless Tobacco Never     Family History:   Family History   Adopted: Yes   Problem Relation Age of Onset    Obesity Mother     Obesity Sister     Obesity Brother     No Known Problems Son     Obesity Sister        Meds/Allergies   Current Outpatient Medications   Medication Sig Dispense Refill    amLODIPine (NORVASC) 10 mg tablet Take 10 mg by mouth daily      aspirin (ECOTRIN LOW STRENGTH) 81 mg EC tablet Take 81 mg by mouth daily      atenolol (TENORMIN) 25 mg tablet Take 50 mg by mouth daily      atorvastatin (LIPITOR) 20 mg tablet Take 20 mg by mouth daily        B-D ULTRAFINE III SHORT PEN 31G X 8 MM MISC Use 4 pen needles daily 400 each 2    Continuous Blood Gluc  (Dexcom G7 ) SCOTTY USE 1 DEVICE CONTINUOUS 1 each 0    Continuous Glucose Sensor (Dexcom G7 Sensor) USE 1 DEVICE EVERY 10 DAYS 3 each 0    insulin lispro (HumaLOG KwikPen) 100 units/mL injection pen 12-22 units per sliding scale at meals 3 times daily, use up to 75 units daily 30 mL 2    losartan-hydrochlorothiazide (HYZAAR) 100-25 MG per tablet Take 1 tablet by mouth daily        Multiple Vitamins-Minerals (MENS 50+ MULTI VITAMIN/MIN PO) Take by mouth      Multiple Vitamins-Minerals (OCUVITE ADULT 50+ PO) Take by mouth Take 2 daily      NovoLOG FlexPen 100 units/mL injection pen 12-22 units per sliding scale at meals 3 times daily, use up to 75 units daily 75 mL 1    torsemide (DEMADEX) 20 mg tablet TAKE 2 TABLETS (40 MG TOTAL) BY MOUTH DAILY. 180 tablet 1    Tresiba FlexTouch 200 units/mL CONCENTRATED U-200 injection pen Inject 80 Units under the skin daily at  "bedtime 36 mL 1    Blood Glucose Monitoring Suppl (ONE TOUCH ULTRA 2) w/Device KIT Test 3 times daily 1 kit 0    Lancets (OneTouch Delica Plus [id number]) MISC Test 3 times daily 300 each 3    OneTouch Ultra test strip Test 3 times daily 300 strip 3     No current facility-administered medications for this visit.     Allergies   Allergen Reactions    Penicillins Other (See Comments)     unknown       Objective   Vitals: Blood pressure 154/76, pulse 56, height 6' 2\" (1.88 m), weight (!) 155 kg (342 lb 12.8 oz), SpO2 99%.    Physical Exam  Vitals and nursing note reviewed.   Constitutional:       General: He is not in acute distress.     Appearance: Normal appearance. He is not diaphoretic.   HENT:      Head: Normocephalic and atraumatic.   Eyes:      General: No scleral icterus.     Extraocular Movements: Extraocular movements intact.      Conjunctiva/sclera: Conjunctivae normal.      Pupils: Pupils are equal, round, and reactive to light.   Cardiovascular:      Rate and Rhythm: Normal rate and regular rhythm.      Pulses: no weak pulses.           Dorsalis pedis pulses are 2+ on the right side and 2+ on the left side.        Posterior tibial pulses are 2+ on the right side and 2+ on the left side.      Heart sounds: No murmur heard.  Pulmonary:      Effort: Pulmonary effort is normal. No respiratory distress.      Breath sounds: Normal breath sounds. No wheezing.   Musculoskeletal:      Cervical back: Normal range of motion.      Right lower leg: Edema present.      Left lower leg: Edema present.   Feet:      Right foot:      Skin integrity: Dry skin present. No ulcer, skin breakdown, erythema, warmth or callus.      Left foot:      Skin integrity: Dry skin present. No ulcer, skin breakdown, erythema, warmth or callus.   Lymphadenopathy:      Cervical: No cervical adenopathy.   Skin:     General: Skin is warm and dry.   Neurological:      Mental Status: He is alert and oriented to person, place, and time. Mental " status is at baseline.      Sensory: No sensory deficit.      Gait: Gait normal.   Psychiatric:         Mood and Affect: Mood normal.         Behavior: Behavior normal.         Thought Content: Thought content normal.     Patient's shoes and socks removed.    Right Foot/Ankle   Right Foot Inspection  Skin Exam: skin normal, skin intact and dry skin. No warmth, no callus, no erythema, no maceration, no abnormal color, no pre-ulcer, no ulcer and no callus.     Toe Exam: ROM and strength within normal limits. No tenderness, erythema and  no right toe deformity    Sensory   Proprioception: intact  Monofilament testing: diminished    Vascular  Capillary refills: < 3 seconds  The right DP pulse is 2+. The right PT pulse is 2+.     Left Foot/Ankle  Left Foot Inspection  Skin Exam: skin normal, skin intact and dry skin. No warmth, no erythema, no maceration, normal color, no pre-ulcer, no ulcer and no callus.     Toe Exam: ROM and strength within normal limits. No tenderness, no erythema and no left toe deformity.     Sensory   Proprioception: intact  Monofilament testing: diminished    Vascular  Capillary refills: < 3 seconds  The left DP pulse is 2+. The left PT pulse is 2+.     Assign Risk Category  No deformity present  Loss of protective sensation  No weak pulses  Risk: 1        The history was obtained from the review of the chart, patient.    Lab Results:   Lab Results   Component Value Date/Time    Hemoglobin A1C 6.8 (H) 07/17/2024 08:10 AM    Hemoglobin A1C 8.2 (H) 04/01/2024 08:26 AM    BUN 44 (H) 07/17/2024 08:10 AM    BUN 42 (H) 04/01/2024 08:26 AM    Potassium 5.0 07/17/2024 08:10 AM    Potassium 4.6 04/01/2024 08:26 AM    Chloride 105 07/17/2024 08:10 AM    Chloride 106 04/01/2024 08:26 AM    CO2 24 07/17/2024 08:10 AM    CO2 21 04/01/2024 08:26 AM    Creatinine 1.50 (H) 07/17/2024 08:10 AM    Creatinine 1.41 (H) 04/01/2024 08:26 AM    AST 16 07/17/2024 08:10 AM    AST 23 04/01/2024 08:26 AM    ALT 21  "07/17/2024 08:10 AM    ALT 25 04/01/2024 08:26 AM    Protein, Total 6.6 07/17/2024 08:10 AM    Protein, Total 6.6 04/01/2024 08:26 AM    Albumin 4.3 07/17/2024 08:10 AM    Albumin 4.0 04/01/2024 08:26 AM    Globulin, Total 2.3 07/17/2024 08:10 AM    Globulin, Total 2.6 04/01/2024 08:26 AM    HDL 33 (L) 04/01/2024 08:26 AM    Triglycerides 235 (H) 04/01/2024 08:26 AM           Imaging Studies: Results Review Statement: No pertinent imaging studies reviewed.    Portions of the record may have been created with voice recognition software. Occasional wrong word or \"sound a like\" substitutions may have occurred due to the inherent limitations of voice recognition software. Read the chart carefully and recognize, using context, where substitutions have occurred.    "

## 2025-01-03 ENCOUNTER — TELEPHONE (OUTPATIENT)
Age: 65
End: 2025-01-03

## 2025-01-03 ENCOUNTER — TELEPHONE (OUTPATIENT)
Dept: OTHER | Facility: HOSPITAL | Age: 65
End: 2025-01-03

## 2025-01-03 RX ORDER — BLOOD-GLUCOSE METER
EACH MISCELLANEOUS 3 TIMES DAILY
Refills: 0 | OUTPATIENT
Start: 2025-01-03

## 2025-01-03 NOTE — TELEPHONE ENCOUNTER
Reason for call:   [x] Prior Auth  [] Other:     Caller:  [] Patient  [x] Pharmacy  Name: [facility]   Address: [address]  Callback Number: [phone]    Medication: Blood Glucose Monitoring Suppl (ONE TOUCH ULTRA 2) w/Device KIT     Dose/Frequency:  Test 3 times daily     Quantity: 1 kit    Ordering Provider:   [] PCP/Provider -   [x] Speciality/Provider - Frances/Christophe Nieto

## 2025-01-03 NOTE — TELEPHONE ENCOUNTER
PA for (ONE TOUCH ULTRA 2) w/Device KIT SUBMITTED to [facility]    via    [x]CMM-KEY: [id number]      [x]PA sent as URGENT    All office notes, labs and other pertaining documents and studies sent. Clinical questions answered. Awaiting determination from insurance company.     Turnaround time for your insurance to make a decision on your Prior Authorization can take 7-21 business days.

## 2025-01-07 NOTE — TELEPHONE ENCOUNTER
PA for (ONE TOUCH ULTRA 2) w/Device KIT  EXCLUDED from plan       Reason:(Screenshot if applicable)        Message sent to office clinical pool Yes

## 2025-01-08 NOTE — TELEPHONE ENCOUNTER
Unable to reach or leave message. His voice mail box is full.     Insurance will not cover a Glucometer. Since he is on a Dexcom they won't cover both. He would have to pay out of pocket for test strips. He can get a cheap off brand meter from the local pharmacy if he wants that would not require a prescription to have on hand as a back up.

## 2025-01-28 DIAGNOSIS — E11.65 TYPE 2 DIABETES MELLITUS WITH HYPERGLYCEMIA, WITH LONG-TERM CURRENT USE OF INSULIN (HCC): ICD-10-CM

## 2025-01-28 DIAGNOSIS — Z79.4 TYPE 2 DIABETES MELLITUS WITH HYPERGLYCEMIA, WITH LONG-TERM CURRENT USE OF INSULIN (HCC): ICD-10-CM

## 2025-01-28 DIAGNOSIS — E11.42 DIABETIC POLYNEUROPATHY ASSOCIATED WITH TYPE 2 DIABETES MELLITUS (HCC): ICD-10-CM

## 2025-01-28 DIAGNOSIS — E11.21 DIABETIC NEPHROPATHY ASSOCIATED WITH TYPE 2 DIABETES MELLITUS (HCC): ICD-10-CM

## 2025-01-28 NOTE — TELEPHONE ENCOUNTER
Reason for call:   [x] Refill   [] Prior Auth  [] Other:     Office:   [] PCP/Provider -   [x] Specialty/Provider - ENDOCRINOLOGY     Medication: (Dexcom G7 Sensor)     Dose/Frequency:  USE 1 DEVICE EVERY 10 DAYS     Quantity: 3 each    Pharmacy: [facility] #[id number] - [address]      Does the patient have enough for 3 days?   [x] Yes   [] No - Send as HP to POD

## 2025-01-29 RX ORDER — ACYCLOVIR 400 MG/1
1 TABLET ORAL
Qty: 3 EACH | Refills: 0 | Status: SHIPPED | OUTPATIENT
Start: 2025-01-29

## 2025-02-27 DIAGNOSIS — E11.65 TYPE 2 DIABETES MELLITUS WITH HYPERGLYCEMIA, WITH LONG-TERM CURRENT USE OF INSULIN (HCC): ICD-10-CM

## 2025-02-27 DIAGNOSIS — Z79.4 TYPE 2 DIABETES MELLITUS WITH HYPERGLYCEMIA, WITH LONG-TERM CURRENT USE OF INSULIN (HCC): ICD-10-CM

## 2025-02-27 DIAGNOSIS — E11.42 DIABETIC POLYNEUROPATHY ASSOCIATED WITH TYPE 2 DIABETES MELLITUS (HCC): ICD-10-CM

## 2025-02-27 DIAGNOSIS — E11.21 DIABETIC NEPHROPATHY ASSOCIATED WITH TYPE 2 DIABETES MELLITUS (HCC): ICD-10-CM

## 2025-02-27 RX ORDER — ACYCLOVIR 400 MG/1
1 TABLET ORAL
Qty: 3 EACH | Refills: 5 | Status: SHIPPED | OUTPATIENT
Start: 2025-02-27

## 2025-03-12 ENCOUNTER — RESULTS FOLLOW-UP (OUTPATIENT)
Dept: ENDOCRINOLOGY | Facility: HOSPITAL | Age: 65
End: 2025-03-12

## 2025-03-12 LAB
ALBUMIN SERPL-MCNC: 4.1 G/DL (ref 3.9–4.9)
ALBUMIN/CREAT UR: 628 MG/G CREAT (ref 0–29)
ALP SERPL-CCNC: 73 IU/L (ref 44–121)
ALT SERPL-CCNC: 42 IU/L (ref 0–44)
AST SERPL-CCNC: 36 IU/L (ref 0–40)
BILIRUB SERPL-MCNC: 0.4 MG/DL (ref 0–1.2)
BUN SERPL-MCNC: 45 MG/DL (ref 8–27)
BUN/CREAT SERPL: 29 (ref 10–24)
CALCIUM SERPL-MCNC: 9.1 MG/DL (ref 8.6–10.2)
CHLORIDE SERPL-SCNC: 102 MMOL/L (ref 96–106)
CO2 SERPL-SCNC: 24 MMOL/L (ref 20–29)
CREAT SERPL-MCNC: 1.53 MG/DL (ref 0.76–1.27)
CREAT UR-MCNC: 81.8 MG/DL
EGFR: 50 ML/MIN/1.73
GLOBULIN SER-MCNC: 2.6 G/DL (ref 1.5–4.5)
GLUCOSE SERPL-MCNC: 122 MG/DL (ref 70–99)
HBA1C MFR BLD: 7.1 % (ref 4.8–5.6)
MICROALBUMIN UR-MCNC: 513.9 UG/ML
POTASSIUM SERPL-SCNC: 4.6 MMOL/L (ref 3.5–5.2)
PROT SERPL-MCNC: 6.7 G/DL (ref 6–8.5)
SODIUM SERPL-SCNC: 140 MMOL/L (ref 134–144)

## 2025-04-02 ENCOUNTER — OFFICE VISIT (OUTPATIENT)
Dept: ENDOCRINOLOGY | Facility: HOSPITAL | Age: 65
End: 2025-04-02
Payer: COMMERCIAL

## 2025-04-02 VITALS
BODY MASS INDEX: 40.43 KG/M2 | HEIGHT: 74 IN | HEART RATE: 57 BPM | OXYGEN SATURATION: 97 % | DIASTOLIC BLOOD PRESSURE: 62 MMHG | WEIGHT: 315 LBS | SYSTOLIC BLOOD PRESSURE: 144 MMHG

## 2025-04-02 DIAGNOSIS — Z79.4 TYPE 2 DIABETES MELLITUS WITH HYPERGLYCEMIA, WITH LONG-TERM CURRENT USE OF INSULIN (HCC): Primary | ICD-10-CM

## 2025-04-02 DIAGNOSIS — E78.2 MIXED HYPERLIPIDEMIA: ICD-10-CM

## 2025-04-02 DIAGNOSIS — I10 PRIMARY HYPERTENSION: ICD-10-CM

## 2025-04-02 DIAGNOSIS — E11.65 TYPE 2 DIABETES MELLITUS WITH HYPERGLYCEMIA, WITH LONG-TERM CURRENT USE OF INSULIN (HCC): Primary | ICD-10-CM

## 2025-04-02 PROCEDURE — 99214 OFFICE O/P EST MOD 30 MIN: CPT | Performed by: PHYSICIAN ASSISTANT

## 2025-04-02 RX ORDER — TIRZEPATIDE 2.5 MG/.5ML
2.5 INJECTION, SOLUTION SUBCUTANEOUS WEEKLY
Qty: 2 ML | Refills: 5 | Status: SHIPPED | OUTPATIENT
Start: 2025-04-02

## 2025-04-02 RX ORDER — LOSARTAN POTASSIUM 50 MG/1
50 TABLET ORAL DAILY
COMMUNITY
Start: 2025-03-12

## 2025-04-02 NOTE — ASSESSMENT & PLAN NOTE
A1c has increased, no glucose levels available at today's office visit.  He was recommended by his cardiologist to discuss with us on starting a GLP-1, specifically Mounjaro.  At this time with his current A1c, weight, and other medical issues I do think it be beneficial to start this medication.  Discussed side effects of medication.  He will start Mounjaro 2.5 mg weekly.  Contact the office if there is any concerns.  Continue with Tresiba 80 units daily and same dose of Humalog.  If not using a Dexcom, make sure to check blood sugars on a daily basis and to bring them in to next office visit.  Contact the office with any concerns or questions.  Follow-up in 3 months with labwork completed prior to visit.  Lab Results   Component Value Date    HGBA1C 7.1 (H) 03/10/2025       Orders:  •  Hemoglobin A1C; Future  •  Comprehensive metabolic panel; Future  •  Tirzepatide (Mounjaro) 2.5 MG/0.5ML SOAJ; Inject 2.5 mg under the skin once a week

## 2025-04-02 NOTE — PATIENT INSTRUCTIONS
Monitor diet and maintain physical activity.     Continue Tresiba to 80 units daily. Continue with same dose of Humalog.    Start Mounjaro 2.5 mg weekly.     Send in blood sugar logs in 2 weeks.     Contact the office with any concerns or questions.     Use Dexcom to monitor blood sugars.     Follow-up in 3 months with lab work completed prior to visit

## 2025-04-02 NOTE — ASSESSMENT & PLAN NOTE
Normotensive in the office today.  Kidney functions remained stable with normal electrolytes.  Being managed by cardiology.  Repeat CMP prior to next office visit.

## 2025-04-02 NOTE — PROGRESS NOTES
Name: Kevin Meza      : 1960      MRN: [id number]  Encounter Provider: Christophe Nieto PA-C  Encounter Date: 2025   Encounter department: [facility]    No chief complaint on file.  :  Assessment & Plan  Type 2 diabetes mellitus with hyperglycemia, with long-term current use of insulin (HCC)  A1c has increased, no glucose levels available at today's office visit.  He was recommended by his cardiologist to discuss with us on starting a GLP-1, specifically Mounjaro.  At this time with his current A1c, weight, and other medical issues I do think it be beneficial to start this medication.  Discussed side effects of medication.  He will start Mounjaro 2.5 mg weekly.  Contact the office if there is any concerns.  Continue with Tresiba 80 units daily and same dose of Humalog.  If not using a Dexcom, make sure to check blood sugars on a daily basis and to bring them in to next office visit.  Contact the office with any concerns or questions.  Follow-up in 3 months with labwork completed prior to visit.  Lab Results   Component Value Date    HGBA1C 7.1 (H) 03/10/2025       Orders:  •  Hemoglobin A1C; Future  •  Comprehensive metabolic panel; Future  •  Tirzepatide (Mounjaro) 2.5 MG/0.5ML SOAJ; Inject 2.5 mg under the skin once a week    Primary hypertension  Normotensive in the office today.  Kidney functions remained stable with normal electrolytes.  Being managed by cardiology.  Repeat CMP prior to next office visit.       Mixed hyperlipidemia  Previous lipid panel did have slightly elevated triglycerides, but everything else was excellent.  Is currently being managed by cardiology.  Continue with same dose of atorvastatin.  Repeat lipid panel prior to next office visit.           History of Present Illness     Kevin Meza is a 65 y.o. male  with type 2 diabetes, insulin requiring with retinopathy, neuropathy, nephropathy for 14 years, hypertension,  hyperlipidemia for follow-up.  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  Prior to that, he was only on metformin and was poorly managing his diabetes. He is on insulin at home and takes Tresiba Insulin 80 units at bedtime and novolog insulin 14 to 16 units with breakfast and 18-20 units with lunch and dinner and sliding scale, 2 units for every 50 points above 150. Typically will take no more than 24 units.  He admits to polyuria, polydipsia, and nocturia every 2 and half to 3 hours.  He denies polyphagia and blurry vision.  States recently neuropathy has improved.  He denies chest pain or shortness of breath. He denies heart attack, stroke and claudication but does admit to neuropathy, nephropathy and retinopathy.  Beginning of the month he was admitted to the hospital for heart failure.  Medications were adjusted, and seems to be doing better at this time.  States that he lost 21 pounds of fluid in the hospital, and is continuing to lose weight at this time.  Is feeling much better.  Was encouraged to discuss with us possibility of using a GLP-1 such as Mounjaro.     Hypoglycemic episodes: Rare episodes.  H/o of hypoglycemia causing hospitalization or intervention such as glucagon injection  or ambulance call  No.  Hypoglycemia symptoms: dizziness, sweating and feels lightheaded and off now..  Treatment of hypoglycemia: will eat a piece of candy or drink a juice.  Glucagon:No.  Medic alert tag: recommended,Yes.      The patient's last eye exam was in November 2021 at [facility] eye every 6 weks for retinopathy, gets injections as needed, post lasar surgery twice in the past.  The patient's last foot exam was not recently. Does not see podiatry.  Most recent hemoglobin A1c was completed March 10, 2025 was 7.1.     Blood Sugar/Glucometer/Pump/CGM review: No blood sugar logs  present at today's office visit.     He has hyperlipidemia and takes atorvastatin 20 mg daily. He denies chest pain or shortness of breath.     He has hypertension and takes losartan 50 mg daily, torsemide 40 mg in the morning and 20 mg in the evening, and amlodipine 10 mg daily, atenolol 50 mg daily.  He denies headache or stroke-like symptoms.    Current regimen:   Tresiba 80 units daily, Humalog 16 units with breakfast and 20 units with lunch and dinner plus sliding scale      Last Eye Exam: 08/17/2023  Last Foot Exam: 01/02/2025  Health Maintenance   Topic Date Due   • Diabetic Eye Exam  04/02/2025 (Originally 8/17/2024)   • Diabetic Foot Exam  01/02/2026           Review of Systems   Constitutional:  Negative for activity change, appetite change, fatigue and unexpected weight change.   HENT:  Negative for trouble swallowing.    Eyes:  Negative for visual disturbance.   Respiratory:  Negative for chest tightness and shortness of breath.    Cardiovascular:  Positive for leg swelling. Negative for chest pain and palpitations.   Gastrointestinal:  Negative for abdominal pain, diarrhea, nausea and vomiting.   Endocrine: Negative for cold intolerance, heat intolerance, polydipsia, polyphagia and polyuria.   Genitourinary:  Negative for frequency.        Nocturia   Skin:  Negative for rash and wound.   Neurological:  Negative for dizziness, weakness, light-headedness, numbness and headaches.   Psychiatric/Behavioral:  Negative for dysphoric mood and sleep disturbance. The patient is not nervous/anxious.     as per HPI    Medical History Reviewed by provider this encounter:     .  Current Outpatient Medications on File Prior to Visit   Medication Sig Dispense Refill   • amLODIPine (NORVASC) 10 mg tablet Take 10 mg by mouth daily     • aspirin (ECOTRIN LOW STRENGTH) 81 mg EC tablet Take 81 mg by mouth daily     • atenolol (TENORMIN) 25 mg tablet Take 50 mg by mouth daily     • atorvastatin (LIPITOR) 20 mg tablet Take 20  mg by mouth daily       • B-D ULTRAFINE III SHORT PEN 31G X 8 MM MISC Use 4 pen needles daily 400 each 2   • Blood Glucose Monitoring Suppl (ONE TOUCH ULTRA 2) w/Device KIT Test 3 times daily 1 kit 0   • Continuous Blood Gluc  (Dexcom G7 ) SCOTTY USE 1 DEVICE CONTINUOUS 1 each 0   • Continuous Glucose Sensor (Dexcom G7 Sensor) USE 1 DEVICE EVERY 10 DAYS 3 each 5   • insulin lispro (HumaLOG KwikPen) 100 units/mL injection pen 12-22 units per sliding scale at meals 3 times daily, use up to 75 units daily 30 mL 2   • Lancets (OneTouch Delica Plus Phaltx37H) MISC Test 3 times daily 300 each 3   • losartan (COZAAR) 50 mg tablet Take 50 mg by mouth daily     • Multiple Vitamins-Minerals (MENS 50+ MULTI VITAMIN/MIN PO) Take by mouth     • Multiple Vitamins-Minerals (OCUVITE ADULT 50+ PO) Take by mouth Take 2 daily     • OneTouch Ultra test strip Test 3 times daily 300 strip 3   • torsemide (DEMADEX) 20 mg tablet TAKE 2 TABLETS (40 MG TOTAL) BY MOUTH DAILY. (Patient taking differently: 40 mg in the morning and 20 mg at nigh) 180 tablet 1   • Tresiba FlexTouch 200 units/mL CONCENTRATED U-200 injection pen Inject 80 Units under the skin daily at bedtime 36 mL 1   • NovoLOG FlexPen 100 units/mL injection pen 12-22 units per sliding scale at meals 3 times daily, use up to 75 units daily (Patient not taking: Reported on 2025) 75 mL 1   • [DISCONTINUED] losartan-hydrochlorothiazide (HYZAAR) 100-25 MG per tablet Take 1 tablet by mouth daily         No current facility-administered medications on file prior to visit.      Social History     Tobacco Use   • Smoking status: Former     Current packs/day: 0.00     Types: Cigarettes     Quit date:      Years since quittin.2   • Smokeless tobacco: Never   Vaping Use   • Vaping status: Never Used   Substance and Sexual Activity   • Alcohol use: Never   • Drug use: Never   • Sexual activity: Not on file        Medical History Reviewed by provider this encounter:   "   .    Objective   /62   Pulse 57   Ht 6' 2\" (1.88 m)   Wt (!) 144 kg (318 lb)   SpO2 97%   BMI 40.83 kg/m²      Body mass index is 40.83 kg/m².  Wt Readings from Last 3 Encounters:   04/02/25 (!) 144 kg (318 lb)   01/02/25 (!) 155 kg (342 lb 12.8 oz)   07/19/24 (!) 149 kg (327 lb 9.6 oz)     Physical Exam  Vitals and nursing note reviewed.   Constitutional:       General: He is not in acute distress.     Appearance: Normal appearance. He is well-developed. He is not diaphoretic.   Eyes:      General: No scleral icterus.     Extraocular Movements: Extraocular movements intact.      Conjunctiva/sclera: Conjunctivae normal.      Pupils: Pupils are equal, round, and reactive to light.   Cardiovascular:      Rate and Rhythm: Normal rate and regular rhythm.      Pulses: Normal pulses.      Heart sounds: Normal heart sounds. No murmur heard.     No friction rub. No gallop.   Pulmonary:      Effort: Pulmonary effort is normal. No tachypnea, bradypnea or respiratory distress.      Breath sounds: Normal breath sounds. No wheezing.   Musculoskeletal:      Cervical back: Normal range of motion.      Right lower leg: Edema present.      Left lower leg: Edema present.   Lymphadenopathy:      Cervical: No cervical adenopathy.   Skin:     General: Skin is warm and dry.   Neurological:      Mental Status: He is alert and oriented to person, place, and time. Mental status is at baseline. He is not disoriented.      Motor: No abnormal muscle tone.      Gait: Gait normal.      Deep Tendon Reflexes: Reflexes are normal and symmetric.   Psychiatric:         Mood and Affect: Mood normal.         Behavior: Behavior normal.         Thought Content: Thought content normal.         Labs:   Lab Results   Component Value Date    HGBA1C 7.1 (H) 03/10/2025    HGBA1C 6.8 (H) 07/17/2024    HGBA1C 8.2 (H) 04/01/2024     Lab Results   Component Value Date    CREATININE 1.53 (H) 03/10/2025    CREATININE 1.50 (H) 07/17/2024    CREATININE " "1.41 (H) 04/01/2024    BUN 45 (H) 03/10/2025    K 4.6 03/10/2025     03/10/2025    CO2 24 03/10/2025     eGFR   Date Value Ref Range Status   03/10/2025 50 (L) >59 mL/min/1.73 Final     Lab Results   Component Value Date    HDL 33 (L) 04/01/2024    TRIG 235 (H) 04/01/2024     Lab Results   Component Value Date    ALT 42 03/10/2025    AST 36 03/10/2025     No results found for: \"[id number]\"    Patient Instructions   Monitor diet and maintain physical activity.     Continue Tresiba to 80 units daily. Continue with same dose of Humalog.    Start Mounjaro 2.5 mg weekly.     Send in blood sugar logs in 2 weeks.     Contact the office with any concerns or questions.     Use Dexcom to monitor blood sugars.     Follow-up in 3 months with lab work completed prior to visit    Discussed with the patient and all questioned fully answered. He will call me if any problems arise.      "

## 2025-04-02 NOTE — ASSESSMENT & PLAN NOTE
Previous lipid panel did have slightly elevated triglycerides, but everything else was excellent.  Is currently being managed by cardiology.  Continue with same dose of atorvastatin.  Repeat lipid panel prior to next office visit.

## 2025-04-03 ENCOUNTER — TELEPHONE (OUTPATIENT)
Dept: ENDOCRINOLOGY | Facility: HOSPITAL | Age: 65
End: 2025-04-03

## 2025-04-07 NOTE — TELEPHONE ENCOUNTER
PA for Mounjaro 2.5mg SUBMITTED to [employer]    via    [x]CMM-KEY: [id number]  []Surescripts-Case ID #   []Availity-Auth ID # NDC #   []Faxed to plan   []Other website   []Phone call Case ID #     [x]PA sent as URGENT    All office notes, labs and other pertaining documents and studies sent. Clinical questions answered. Awaiting determination from insurance company.     Turnaround time for your insurance to make a decision on your Prior Authorization can take 7-21 business days.

## 2025-04-23 ENCOUNTER — TELEPHONE (OUTPATIENT)
Dept: ENDOCRINOLOGY | Facility: HOSPITAL | Age: 65
End: 2025-04-23

## 2025-04-23 NOTE — TELEPHONE ENCOUNTER
Patient states that his insurance will no longer cover the humalog but will cover the lispro.   Patient is asking for a new script to be sent to [facility]/pharmacy #[id number] - [place] - [phone]

## 2025-04-23 NOTE — TELEPHONE ENCOUNTER
Spoke with patient, he is asking about the pros and cons of starting the Mounjaro? Please advise, thank you.

## 2025-04-23 NOTE — TELEPHONE ENCOUNTER
Main benefit of Mounjaro especially from an endocrine standpoint would be to help lower blood sugars.  Ultimately this could reduce the amount of insulin that he is using.  A possible benefit with this class of medication is also weight loss.  From personal experience not everyone who uses these medications will lose weight.  If weight loss does occur this could help with blood pressure, and decrease insulin resistance helping blood sugars.  Research has shown that using Mounjaro or similar medications reduces the risk of cardiovascular events such as heart attack and stroke.  I can also help improve cholesterol levels.  Recent research has shown helping protect kidney function in people with chronic kidney disease.    As far as complications and possible side effects.  People can experience nausea, this is likely due to slowing down the GI tract, and suppressing appetite.  There is the possibility of causing inflammation of the pancreas, but this is a rare.  Possible long-term complications which are still being investigated at this time is loss of muscle mass and bone density.  The main concern with this is that people are using this medication for weight loss and not making other appropriate lifestyle modifications.  Even though this medication may provide benefits as noted above, it is still very important to focus on lifestyle such as appropriate diet and routine physical activity to help with overall health.

## 2025-04-25 ENCOUNTER — TELEPHONE (OUTPATIENT)
Dept: ENDOCRINOLOGY | Facility: HOSPITAL | Age: 65
End: 2025-04-25

## 2025-04-25 DIAGNOSIS — E11.65 TYPE 2 DIABETES MELLITUS WITH HYPERGLYCEMIA, WITH LONG-TERM CURRENT USE OF INSULIN (HCC): ICD-10-CM

## 2025-04-25 DIAGNOSIS — Z79.4 TYPE 2 DIABETES MELLITUS WITH HYPERGLYCEMIA, WITH LONG-TERM CURRENT USE OF INSULIN (HCC): ICD-10-CM

## 2025-04-25 RX ORDER — INSULIN DEGLUDEC 200 U/ML
80 INJECTION, SOLUTION SUBCUTANEOUS
Qty: 40 ML | Refills: 1 | Status: SHIPPED | OUTPATIENT
Start: 2025-04-25

## 2025-04-25 NOTE — TELEPHONE ENCOUNTER
Reason for call:   [x] Refill   [] Prior Auth  [] Other:     Office:   [] PCP/Provider -   [x] [facility]ty[facility]  Authorized By: Christophe Nieto PA-C    Medication: Tresiba FlexTouch 200 units/mL CONCENTRATED U-200 injection pen       Pharmacy: [facility]/pharmacy #[id number] - [place] - [phone]     Local Pharmacy   Does the patient have enough for 3 days?   [] Yes   [x] No - Send as HP to POD    Mail Away Pharmacy   Does the patient have enough for 10 days?   [] Yes   [] No - Send as HP to POD

## 2025-04-25 NOTE — TELEPHONE ENCOUNTER
Patient called the RX Refill Line. Message is being forwarded to the office.     Patient is requesting a call back, pt wants to know about medication mounjaro. Can he take it or not? He saw that he is approved but he wants more information on it.    Please contact patient at [phone]   BOB (acute kidney injury)

## 2025-05-01 ENCOUNTER — TELEPHONE (OUTPATIENT)
Dept: ENDOCRINOLOGY | Facility: HOSPITAL | Age: 65
End: 2025-05-01

## 2025-05-01 NOTE — TELEPHONE ENCOUNTER
Patient called in regards to refilling medication Dexcom G7 sensor. Informed patient that there are 3 refills remaining on script and to contact pharmacy to speak to someone live to fill medication. Patient verbalized understanding.

## 2025-06-02 ENCOUNTER — TELEPHONE (OUTPATIENT)
Dept: ENDOCRINOLOGY | Facility: HOSPITAL | Age: 65
End: 2025-06-02

## 2025-06-02 DIAGNOSIS — R60.9 EDEMA, UNSPECIFIED TYPE: ICD-10-CM

## 2025-06-02 NOTE — TELEPHONE ENCOUNTER
Patient called the RX Refill Line. Message is being forwarded to the office.     Patient stated his insurance will no longer cover the humalog but they will cover lispro. He's on his last pen of humalog (opened it today) so he'll need the lispro sen tot the [facility] on file   He'd like a call back once it's been sent     Please contact patient at

## 2025-06-03 DIAGNOSIS — Z79.4 TYPE 2 DIABETES MELLITUS WITH HYPERGLYCEMIA, WITH LONG-TERM CURRENT USE OF INSULIN (HCC): ICD-10-CM

## 2025-06-03 DIAGNOSIS — E11.65 TYPE 2 DIABETES MELLITUS WITH HYPERGLYCEMIA, WITH LONG-TERM CURRENT USE OF INSULIN (HCC): ICD-10-CM

## 2025-06-03 RX ORDER — TORSEMIDE 20 MG/1
40 TABLET ORAL DAILY
Qty: 180 TABLET | Refills: 1 | Status: SHIPPED | OUTPATIENT
Start: 2025-06-03

## 2025-06-03 RX ORDER — INSULIN LISPRO 100 [IU]/ML
INJECTION, SOLUTION INTRAVENOUS; SUBCUTANEOUS
Qty: 30 ML | Refills: 2 | Status: SHIPPED | OUTPATIENT
Start: 2025-06-03

## 2025-06-13 ENCOUNTER — TELEPHONE (OUTPATIENT)
Age: 65
End: 2025-06-13

## 2025-06-13 NOTE — TELEPHONE ENCOUNTER
Patient questioned why he was only getting one sensor per refill. He was informed that his current prescription was written for 3 sensors with 5 refills. He was advised to speak with [facility] staff to clarify why they're only dispensing one sensor (10-day supply). Patient verbalized understanding and said he would call back if a new prescription was needed.